# Patient Record
Sex: FEMALE | Race: WHITE | NOT HISPANIC OR LATINO
[De-identification: names, ages, dates, MRNs, and addresses within clinical notes are randomized per-mention and may not be internally consistent; named-entity substitution may affect disease eponyms.]

---

## 2017-02-13 ENCOUNTER — APPOINTMENT (OUTPATIENT)
Dept: INTERNAL MEDICINE | Facility: CLINIC | Age: 82
End: 2017-02-13

## 2017-02-13 VITALS
TEMPERATURE: 97.8 F | WEIGHT: 122 LBS | SYSTOLIC BLOOD PRESSURE: 115 MMHG | HEART RATE: 73 BPM | BODY MASS INDEX: 23.33 KG/M2 | DIASTOLIC BLOOD PRESSURE: 70 MMHG | HEIGHT: 60.5 IN

## 2017-02-14 LAB
25(OH)D3 SERPL-MCNC: 30 NG/ML
ALBUMIN SERPL ELPH-MCNC: 4.2 G/DL
ALP BLD-CCNC: 51 U/L
ALT SERPL-CCNC: 10 U/L
ANION GAP SERPL CALC-SCNC: 13 MMOL/L
APPEARANCE: CLEAR
AST SERPL-CCNC: 23 U/L
BASOPHILS # BLD AUTO: 0.02 K/UL
BASOPHILS NFR BLD AUTO: 0.3 %
BILIRUB SERPL-MCNC: 0.2 MG/DL
BILIRUBIN URINE: NEGATIVE
BLOOD URINE: NEGATIVE
BUN SERPL-MCNC: 20 MG/DL
CALCIUM SERPL-MCNC: 9.5 MG/DL
CHLORIDE SERPL-SCNC: 106 MMOL/L
CHOLEST SERPL-MCNC: 184 MG/DL
CHOLEST/HDLC SERPL: 2.4 RATIO
CO2 SERPL-SCNC: 23 MMOL/L
COLOR: YELLOW
CREAT SERPL-MCNC: 0.78 MG/DL
EOSINOPHIL # BLD AUTO: 0.25 K/UL
EOSINOPHIL NFR BLD AUTO: 3.7 %
GLUCOSE QUALITATIVE U: NORMAL MG/DL
GLUCOSE SERPL-MCNC: 85 MG/DL
HBA1C MFR BLD HPLC: 5.8 %
HCT VFR BLD CALC: 43.9 %
HDLC SERPL-MCNC: 76 MG/DL
HGB BLD-MCNC: 14.2 G/DL
IMM GRANULOCYTES NFR BLD AUTO: 0.1 %
KETONES URINE: NEGATIVE
LDLC SERPL CALC-MCNC: 95 MG/DL
LEUKOCYTE ESTERASE URINE: NEGATIVE
LYMPHOCYTES # BLD AUTO: 2.74 K/UL
LYMPHOCYTES NFR BLD AUTO: 40.5 %
MAN DIFF?: NORMAL
MCHC RBC-ENTMCNC: 32.3 GM/DL
MCHC RBC-ENTMCNC: 32.3 PG
MCV RBC AUTO: 100 FL
MONOCYTES # BLD AUTO: 0.61 K/UL
MONOCYTES NFR BLD AUTO: 9 %
NEUTROPHILS # BLD AUTO: 3.14 K/UL
NEUTROPHILS NFR BLD AUTO: 46.4 %
NITRITE URINE: NEGATIVE
PH URINE: 6
PLATELET # BLD AUTO: 253 K/UL
POTASSIUM SERPL-SCNC: 4.6 MMOL/L
PROT SERPL-MCNC: 7.1 G/DL
PROTEIN URINE: NEGATIVE MG/DL
RBC # BLD: 4.39 M/UL
RBC # FLD: 13.4 %
SODIUM SERPL-SCNC: 142 MMOL/L
SPECIFIC GRAVITY URINE: 1.01
TRIGL SERPL-MCNC: 65 MG/DL
TSH SERPL-ACNC: 2.64 UIU/ML
UROBILINOGEN URINE: NORMAL MG/DL
VIT B12 SERPL-MCNC: 562 PG/ML
WBC # FLD AUTO: 6.77 K/UL

## 2017-02-15 LAB — BACTERIA UR CULT: NORMAL

## 2017-02-24 ENCOUNTER — MEDICATION RENEWAL (OUTPATIENT)
Age: 82
End: 2017-02-24

## 2017-04-03 ENCOUNTER — APPOINTMENT (OUTPATIENT)
Dept: INTERNAL MEDICINE | Facility: CLINIC | Age: 82
End: 2017-04-03

## 2017-04-03 VITALS
BODY MASS INDEX: 23.14 KG/M2 | WEIGHT: 121 LBS | HEART RATE: 90 BPM | HEIGHT: 60.5 IN | TEMPERATURE: 98.2 F | SYSTOLIC BLOOD PRESSURE: 117 MMHG | DIASTOLIC BLOOD PRESSURE: 70 MMHG

## 2017-04-09 ENCOUNTER — FORM ENCOUNTER (OUTPATIENT)
Age: 82
End: 2017-04-09

## 2017-04-10 ENCOUNTER — APPOINTMENT (OUTPATIENT)
Dept: RADIOLOGY | Facility: CLINIC | Age: 82
End: 2017-04-10

## 2017-04-10 ENCOUNTER — OUTPATIENT (OUTPATIENT)
Dept: OUTPATIENT SERVICES | Facility: HOSPITAL | Age: 82
LOS: 1 days | End: 2017-04-10
Payer: COMMERCIAL

## 2017-04-10 ENCOUNTER — APPOINTMENT (OUTPATIENT)
Dept: ULTRASOUND IMAGING | Facility: CLINIC | Age: 82
End: 2017-04-10

## 2017-04-10 DIAGNOSIS — R07.89 OTHER CHEST PAIN: ICD-10-CM

## 2017-04-10 DIAGNOSIS — Z00.8 ENCOUNTER FOR OTHER GENERAL EXAMINATION: ICD-10-CM

## 2017-04-10 PROCEDURE — 76700 US EXAM ABDOM COMPLETE: CPT

## 2017-04-10 PROCEDURE — 71046 X-RAY EXAM CHEST 2 VIEWS: CPT

## 2017-04-10 PROCEDURE — 71100 X-RAY EXAM RIBS UNI 2 VIEWS: CPT

## 2017-10-20 ENCOUNTER — APPOINTMENT (OUTPATIENT)
Dept: INTERNAL MEDICINE | Facility: CLINIC | Age: 82
End: 2017-10-20
Payer: MEDICARE

## 2017-10-20 VITALS
OXYGEN SATURATION: 98 % | WEIGHT: 118 LBS | HEIGHT: 60.5 IN | DIASTOLIC BLOOD PRESSURE: 76 MMHG | TEMPERATURE: 97.4 F | BODY MASS INDEX: 22.57 KG/M2 | SYSTOLIC BLOOD PRESSURE: 104 MMHG | HEART RATE: 76 BPM

## 2017-10-20 PROCEDURE — 99214 OFFICE O/P EST MOD 30 MIN: CPT

## 2017-10-23 LAB
CHOLEST SERPL-MCNC: 247 MG/DL
CHOLEST/HDLC SERPL: 3 RATIO
HDLC SERPL-MCNC: 81 MG/DL
LDLC SERPL CALC-MCNC: 147 MG/DL
TRIGL SERPL-MCNC: 96 MG/DL

## 2017-11-28 ENCOUNTER — RX RENEWAL (OUTPATIENT)
Age: 82
End: 2017-11-28

## 2018-01-02 ENCOUNTER — APPOINTMENT (OUTPATIENT)
Dept: INTERNAL MEDICINE | Facility: CLINIC | Age: 83
End: 2018-01-02
Payer: MEDICARE

## 2018-01-02 VITALS
WEIGHT: 120 LBS | DIASTOLIC BLOOD PRESSURE: 72 MMHG | OXYGEN SATURATION: 98 % | SYSTOLIC BLOOD PRESSURE: 110 MMHG | TEMPERATURE: 98.3 F | BODY MASS INDEX: 22.95 KG/M2 | HEART RATE: 54 BPM | HEIGHT: 60.5 IN

## 2018-01-02 PROCEDURE — 99214 OFFICE O/P EST MOD 30 MIN: CPT

## 2018-02-15 ENCOUNTER — APPOINTMENT (OUTPATIENT)
Dept: INTERNAL MEDICINE | Facility: CLINIC | Age: 83
End: 2018-02-15
Payer: MEDICARE

## 2018-02-15 ENCOUNTER — NON-APPOINTMENT (OUTPATIENT)
Age: 83
End: 2018-02-15

## 2018-02-15 VITALS
DIASTOLIC BLOOD PRESSURE: 70 MMHG | WEIGHT: 120 LBS | TEMPERATURE: 98.7 F | OXYGEN SATURATION: 97 % | SYSTOLIC BLOOD PRESSURE: 100 MMHG | BODY MASS INDEX: 22.95 KG/M2 | HEART RATE: 88 BPM | HEIGHT: 60.5 IN

## 2018-02-15 DIAGNOSIS — H91.92 UNSPECIFIED HEARING LOSS, LEFT EAR: ICD-10-CM

## 2018-02-15 PROCEDURE — G0444 DEPRESSION SCREEN ANNUAL: CPT

## 2018-02-15 PROCEDURE — 93000 ELECTROCARDIOGRAM COMPLETE: CPT

## 2018-02-15 PROCEDURE — 36415 COLL VENOUS BLD VENIPUNCTURE: CPT

## 2018-02-15 PROCEDURE — G0439: CPT | Mod: 25

## 2018-02-16 LAB
25(OH)D3 SERPL-MCNC: 30.1 NG/ML
ALBUMIN SERPL ELPH-MCNC: 4.2 G/DL
ALP BLD-CCNC: 51 U/L
ALT SERPL-CCNC: 15 U/L
ANION GAP SERPL CALC-SCNC: 15 MMOL/L
APPEARANCE: CLEAR
AST SERPL-CCNC: 24 U/L
BASOPHILS # BLD AUTO: 0.02 K/UL
BASOPHILS NFR BLD AUTO: 0.3 %
BILIRUB SERPL-MCNC: 0.3 MG/DL
BILIRUBIN URINE: NEGATIVE
BLOOD URINE: NEGATIVE
BUN SERPL-MCNC: 15 MG/DL
CALCIUM SERPL-MCNC: 9.8 MG/DL
CHLORIDE SERPL-SCNC: 102 MMOL/L
CHOLEST SERPL-MCNC: 190 MG/DL
CHOLEST/HDLC SERPL: 2.3 RATIO
CO2 SERPL-SCNC: 25 MMOL/L
COLOR: YELLOW
CREAT SERPL-MCNC: 0.77 MG/DL
EOSINOPHIL # BLD AUTO: 0.19 K/UL
EOSINOPHIL NFR BLD AUTO: 2.7 %
GLUCOSE QUALITATIVE U: NEGATIVE MG/DL
GLUCOSE SERPL-MCNC: 92 MG/DL
HCT VFR BLD CALC: 41.7 %
HDLC SERPL-MCNC: 84 MG/DL
HGB BLD-MCNC: 13.7 G/DL
IMM GRANULOCYTES NFR BLD AUTO: 0.1 %
KETONES URINE: NEGATIVE
LDLC SERPL CALC-MCNC: 89 MG/DL
LEUKOCYTE ESTERASE URINE: NEGATIVE
LYMPHOCYTES # BLD AUTO: 2.61 K/UL
LYMPHOCYTES NFR BLD AUTO: 36.9 %
MAN DIFF?: NORMAL
MCHC RBC-ENTMCNC: 32.9 GM/DL
MCHC RBC-ENTMCNC: 33.3 PG
MCV RBC AUTO: 101.5 FL
MONOCYTES # BLD AUTO: 0.61 K/UL
MONOCYTES NFR BLD AUTO: 8.6 %
NEUTROPHILS # BLD AUTO: 3.64 K/UL
NEUTROPHILS NFR BLD AUTO: 51.4 %
NITRITE URINE: NEGATIVE
PH URINE: 7
PLATELET # BLD AUTO: 245 K/UL
POTASSIUM SERPL-SCNC: 4.7 MMOL/L
PROT SERPL-MCNC: 7.4 G/DL
PROTEIN URINE: NEGATIVE MG/DL
RBC # BLD: 4.11 M/UL
RBC # FLD: 14.1 %
SODIUM SERPL-SCNC: 142 MMOL/L
SPECIFIC GRAVITY URINE: 1.02
T4 FREE SERPL-MCNC: 1.2 NG/DL
TRIGL SERPL-MCNC: 84 MG/DL
TSH SERPL-ACNC: 2.05 UIU/ML
UROBILINOGEN URINE: NEGATIVE MG/DL
VIT B12 SERPL-MCNC: 1022 PG/ML
WBC # FLD AUTO: 7.08 K/UL

## 2018-02-19 LAB — BACTERIA UR CULT: NORMAL

## 2018-02-22 ENCOUNTER — MEDICATION RENEWAL (OUTPATIENT)
Age: 83
End: 2018-02-22

## 2018-03-01 ENCOUNTER — MEDICATION RENEWAL (OUTPATIENT)
Age: 83
End: 2018-03-01

## 2018-03-22 ENCOUNTER — APPOINTMENT (OUTPATIENT)
Dept: INTERNAL MEDICINE | Facility: CLINIC | Age: 83
End: 2018-03-22
Payer: MEDICARE

## 2018-03-22 VITALS
WEIGHT: 122.03 LBS | OXYGEN SATURATION: 98 % | BODY MASS INDEX: 23.34 KG/M2 | SYSTOLIC BLOOD PRESSURE: 90 MMHG | HEIGHT: 60.5 IN | DIASTOLIC BLOOD PRESSURE: 60 MMHG | HEART RATE: 69 BPM | TEMPERATURE: 98 F

## 2018-03-22 PROCEDURE — 99214 OFFICE O/P EST MOD 30 MIN: CPT

## 2018-06-22 ENCOUNTER — APPOINTMENT (OUTPATIENT)
Dept: INTERNAL MEDICINE | Facility: CLINIC | Age: 83
End: 2018-06-22
Payer: MEDICARE

## 2018-06-22 VITALS
WEIGHT: 120 LBS | SYSTOLIC BLOOD PRESSURE: 125 MMHG | HEART RATE: 75 BPM | TEMPERATURE: 97.9 F | HEIGHT: 60.5 IN | OXYGEN SATURATION: 98 % | DIASTOLIC BLOOD PRESSURE: 80 MMHG | BODY MASS INDEX: 22.95 KG/M2

## 2018-06-22 PROCEDURE — 99214 OFFICE O/P EST MOD 30 MIN: CPT

## 2018-06-22 RX ORDER — DOXYCYCLINE 100 MG/1
100 CAPSULE ORAL
Qty: 14 | Refills: 0 | Status: DISCONTINUED | COMMUNITY
Start: 2017-12-28

## 2018-06-22 RX ORDER — CIPROFLOXACIN HYDROCHLORIDE 500 MG/1
500 TABLET, FILM COATED ORAL
Qty: 6 | Refills: 0 | Status: DISCONTINUED | COMMUNITY
Start: 2017-12-27

## 2018-06-22 RX ORDER — MAGNESIUM OXIDE 500 MG
5 TABLET ORAL
Qty: 90 | Refills: 3 | Status: DISCONTINUED | COMMUNITY
Start: 2018-03-22 | End: 2018-06-22

## 2018-06-22 NOTE — ASSESSMENT
[FreeTextEntry1] : Memory loss/ depression - phq 9 --9 improved from 16 - Alzheimers dementia \par -on escitalopram 20 mg - continue same dose tolerating w/u side effetcs \par - stopped melatonin due to diarrhea \par - advised to monitor if worsening to consider Namenda , \par -Advise patient to join community Center to keep herself busy, Involve in activities\par -f/u 3-6 months \par \par urinary incontinence \par -stable\par \par Severe hand deformities/Arthritis\par -Most probably OA , Arthritis panel negative \par \par freckles , moles- wart left fore arm \par -dermatology consult reviewed \par \par Health maintenance\par Pneumovax 2009 \par  Flu vaccine-at pharmacy \par Deferred screening discussed with family son.

## 2018-06-22 NOTE — HISTORY OF PRESENT ILLNESS
[Other: _____] : [unfilled] [de-identified] : \par This is a 89 -year-old female with past medical history of glaucoma, hand arthritis, hyperlipidemia , recent memory loss, who is accompanied by her son for f/u on ch medical condition from last visit \par \par memory loss-on escitalopram 20 po daily for last 4 months - doing better - more engaged , no side effects noted.   Recently noticed memory loss was seen by gerontologist- and was prescribed Namenda, but they did not start as her daughter is neuropsychiatric who thought it was too strong for her- she has only mild memory impairment, physically active, does cooking, and all basic activities of daily living with help from her daughters and son, her daughter manages her billing / shopping\par -Denies any history of falls or getting lost\par -Has problem with recollecting dates, and learning new things \par -she use to go to [facility], with  , but stopped after his death , looking to find one which will suit her needs , doing crossword puzzles \par \par Glaucoma-Sees ophthalmologist every 6 months uses eyedrops\par \par History of hand arthritis Since she was in her 30s- does not take any medication, significant deformities of her hands, arthritis panel negative \par \par h/o urinary incontinence  - had UTI episode 1/2018 , around that episode and since then she has been feeling low depressed - and talks about being in a box and not waking up , sleep ok , appetite ok , low mood \par - wearing pads for accidents for 1 yr \par \par  hearing impairment More, left ear- saw hearing center did testing \par -Lives with daughter and son \par \par stopped going to  [employer] - don’t know why ? daughter got busy

## 2018-11-18 ENCOUNTER — RX RENEWAL (OUTPATIENT)
Age: 83
End: 2018-11-18

## 2019-02-21 ENCOUNTER — APPOINTMENT (OUTPATIENT)
Dept: INTERNAL MEDICINE | Facility: CLINIC | Age: 84
End: 2019-02-21
Payer: MEDICARE

## 2019-02-21 VITALS
HEIGHT: 60.5 IN | TEMPERATURE: 98.7 F | BODY MASS INDEX: 22.95 KG/M2 | SYSTOLIC BLOOD PRESSURE: 90 MMHG | WEIGHT: 120 LBS | HEART RATE: 66 BPM | DIASTOLIC BLOOD PRESSURE: 68 MMHG | RESPIRATION RATE: 14 BRPM

## 2019-02-21 PROCEDURE — G0439: CPT | Mod: 25

## 2019-02-21 PROCEDURE — 36415 COLL VENOUS BLD VENIPUNCTURE: CPT

## 2019-02-21 PROCEDURE — G0444 DEPRESSION SCREEN ANNUAL: CPT

## 2019-02-21 NOTE — ASSESSMENT
[FreeTextEntry1] : Memory loss/ depression - phq 9 --9 improved from 16 - Alzheimers dementia \par -on escitalopram 20 mg - continue same dose tolerating w/u side effetcs \par - stopped melatonin due to diarrhea \par - advised to monitor if worsening to consider Namenda , \par -Advise patient to join community Center to keep herself busy, Involve in activities\par -f/u 3-6 months \par \par Alz dementia- \par - start Namenda  5 mg daily\par f/u 3 months \par \par urinary incontinence \par -get Ua and cultures \par \par Severe hand deformities/Arthritis\par -Most probably OA , Arthritis panel negative \par \par freckles , moles- wart left fore arm \par -dermatology consult reviewed \par \par Health maintenance\par Pneumovax 2009 \par  Flu vaccine-at pharmacy 10/2018 as per son \par Deferred screening discussed with family son.

## 2019-02-21 NOTE — HISTORY OF PRESENT ILLNESS
[Family Member] : family member [Other: _____] : [unfilled] [de-identified] : \par This is a 89 -year-old female with past medical history of glaucoma, hand arthritis, hyperlipidemia , recent memory loss, who is accompanied by her son for annual check up \par \par memory loss-on escitalopram 20 po daily for last 4 months - doing better - more engaged , no side effects noted.   Recently noticed memory loss was seen by gerontologist- and was prescribed Namenda, but they did not start as her daughter is neuropsychiatric who thought it was too strong for her- she has only mild memory impairment, physically active, does cooking, and all basic activities of daily living with help from her daughters and son, her daughter manages her billing / shopping\par -Denies any history of falls or getting lost\par -Has problem with recollecting dates, and learning new things \par -she use to go to [facility], with  , but stopped after his death , looking to find one which will suit her needs , doing crossword puzzles \par \par Glaucoma-Sees ophthalmologist every 6 months uses eyedrops\par \par History of hand arthritis Since she was in her 30s- does not take any medication, significant deformities of her hands, arthritis panel negative \par \par h/o urinary incontinence  - had UTI episode 1/2018 , around that episode and since then she has been feeling low depressed - and talks about being in a box and not waking up , sleep ok , appetite ok , low mood \par - wearing pads for accidents for 1 yr \par \par  hearing impairment More, left ear- saw hearing center did testing \par -Lives with daughter and son \par \par stopped going to  [employer] - don’t know why ? daughter got busy

## 2019-02-22 LAB
25(OH)D3 SERPL-MCNC: 29.1 NG/ML
ALBUMIN SERPL ELPH-MCNC: 4.3 G/DL
ALP BLD-CCNC: 52 U/L
ALT SERPL-CCNC: 14 U/L
ANION GAP SERPL CALC-SCNC: 12 MMOL/L
AST SERPL-CCNC: 24 U/L
BASOPHILS # BLD AUTO: 0.03 K/UL
BASOPHILS NFR BLD AUTO: 0.5 %
BILIRUB SERPL-MCNC: 0.4 MG/DL
BUN SERPL-MCNC: 14 MG/DL
CALCIUM SERPL-MCNC: 9.5 MG/DL
CHLORIDE SERPL-SCNC: 105 MMOL/L
CHOLEST SERPL-MCNC: 186 MG/DL
CHOLEST/HDLC SERPL: 2.5 RATIO
CO2 SERPL-SCNC: 24 MMOL/L
CREAT SERPL-MCNC: 0.69 MG/DL
EOSINOPHIL # BLD AUTO: 0.18 K/UL
EOSINOPHIL NFR BLD AUTO: 2.8 %
GLUCOSE SERPL-MCNC: 90 MG/DL
HCT VFR BLD CALC: 44 %
HDLC SERPL-MCNC: 74 MG/DL
HGB BLD-MCNC: 14.3 G/DL
IMM GRANULOCYTES NFR BLD AUTO: 0.2 %
LDLC SERPL CALC-MCNC: 94 MG/DL
LYMPHOCYTES # BLD AUTO: 2.17 K/UL
LYMPHOCYTES NFR BLD AUTO: 34.3 %
MAN DIFF?: NORMAL
MCHC RBC-ENTMCNC: 32.5 GM/DL
MCHC RBC-ENTMCNC: 33.3 PG
MCV RBC AUTO: 102.3 FL
MONOCYTES # BLD AUTO: 0.46 K/UL
MONOCYTES NFR BLD AUTO: 7.3 %
NEUTROPHILS # BLD AUTO: 3.47 K/UL
NEUTROPHILS NFR BLD AUTO: 54.9 %
PLATELET # BLD AUTO: 238 K/UL
POTASSIUM SERPL-SCNC: 4.9 MMOL/L
PROT SERPL-MCNC: 7.2 G/DL
RBC # BLD: 4.3 M/UL
RBC # FLD: 13.3 %
SODIUM SERPL-SCNC: 141 MMOL/L
TRIGL SERPL-MCNC: 89 MG/DL
TSH SERPL-ACNC: 2.61 UIU/ML
VIT B12 SERPL-MCNC: 938 PG/ML
WBC # FLD AUTO: 6.32 K/UL

## 2019-05-16 ENCOUNTER — MEDICATION RENEWAL (OUTPATIENT)
Age: 84
End: 2019-05-16

## 2019-05-22 ENCOUNTER — APPOINTMENT (OUTPATIENT)
Dept: INTERNAL MEDICINE | Facility: CLINIC | Age: 84
End: 2019-05-22
Payer: MEDICARE

## 2019-05-22 VITALS
SYSTOLIC BLOOD PRESSURE: 110 MMHG | WEIGHT: 117 LBS | HEART RATE: 80 BPM | HEIGHT: 60.5 IN | DIASTOLIC BLOOD PRESSURE: 70 MMHG | BODY MASS INDEX: 22.38 KG/M2 | OXYGEN SATURATION: 98 %

## 2019-05-22 LAB
APPEARANCE: CLEAR
BILIRUBIN URINE: NEGATIVE
BLOOD URINE: NEGATIVE
COLOR: YELLOW
GLUCOSE QUALITATIVE U: NEGATIVE
KETONES URINE: NEGATIVE
LEUKOCYTE ESTERASE URINE: NEGATIVE
NITRITE URINE: NEGATIVE
PH URINE: 6.5
PROTEIN URINE: NORMAL
SPECIFIC GRAVITY URINE: 1.02
UROBILINOGEN URINE: NORMAL

## 2019-05-22 PROCEDURE — 99214 OFFICE O/P EST MOD 30 MIN: CPT

## 2019-05-22 NOTE — ASSESSMENT
[FreeTextEntry1] : Memory loss/ depression - phq 9 --9 improved from 16 - Alzheimers dementia \par -on escitalopram 20 mg - continue same dose tolerating w/u side effetcs \par - stopped melatonin due to diarrhea \par - advised to monitor if worsening to consider Namenda , \par -Advise patient to join community Center to keep herself busy, Involve in activities\par -f/u 3-6 months \par \par Alz dementia- \par - continue Namenda 5 mg daily\par f/u 3 months \par \par urinary incontinence \par -get Ua and cultures \par \par Severe hand deformities/Arthritis\par -Most probably OA , Arthritis panel negative \par \par freckles , moles- wart left fore arm \par -dermatology consult reviewed \par \par Health maintenance\par Pneumovax 2009 \par  Flu vaccine-at pharmacy 10/2018 as per son \par Deferred screening discussed with family son.

## 2019-05-22 NOTE — HISTORY OF PRESENT ILLNESS
[Other: _____] : [unfilled] [de-identified] : \par This is a 89 -year-old female with past medical history of glaucoma, hand arthritis, hyperlipidemia , recent memory loss, who is accompanied by her son for  f/u and filling forms for HHA \par \par memory loss-on escitalopram 20 po daily for last 4 months - forgetting names - stays up at night , forges to eat food - sits there needs reminding \par -doing better - more engaged , no side effects noted.   Recently noticed memory loss was seen by gerontologist- and was prescribed Namenda, but they did not start as her daughter is neuropsychiatric who thought it was too strong for her- she has only mild memory impairment, physically active, does cooking, and all basic activities of daily living with help from her daughters and son, her daughter manages her billing / shopping\par -Denies any history of falls or getting lost\par -Has problem with recollecting dates, and learning new things \par -she use to go to [facility], with  , but stopped after his death , looking to find one which will suit her needs , doing crossword puzzles \par \par Glaucoma-Sees ophthalmologist every 6 months uses eyedrops\par \par History of hand arthritis Since she was in her 30s- does not take any medication, significant deformities of her hands, arthritis panel negative \par \par h/o urinary incontinence  - had UTI episode 1/2018 , around that episode and since then she has been feeling low depressed - and talks about being in a box and not waking up , sleep ok , appetite ok , low mood \par - wearing pads for accidents for 1 yr \par \par  hearing impairment More, left ear- saw hearing center did testing \par -Lives with daughter and son \par \par stopped going to  [employer] - don’t know why ? daughter got busy

## 2019-05-24 LAB — BACTERIA UR CULT: NORMAL

## 2019-08-20 ENCOUNTER — RX RENEWAL (OUTPATIENT)
Age: 84
End: 2019-08-20

## 2019-08-20 ENCOUNTER — MEDICATION RENEWAL (OUTPATIENT)
Age: 84
End: 2019-08-20

## 2019-11-04 ENCOUNTER — RX RENEWAL (OUTPATIENT)
Age: 84
End: 2019-11-04

## 2019-11-20 ENCOUNTER — APPOINTMENT (OUTPATIENT)
Dept: INTERNAL MEDICINE | Facility: CLINIC | Age: 84
End: 2019-11-20
Payer: MEDICARE

## 2019-11-20 VITALS
HEART RATE: 77 BPM | HEIGHT: 60.5 IN | BODY MASS INDEX: 23.33 KG/M2 | OXYGEN SATURATION: 98 % | DIASTOLIC BLOOD PRESSURE: 56 MMHG | WEIGHT: 122 LBS | SYSTOLIC BLOOD PRESSURE: 94 MMHG | TEMPERATURE: 98.2 F

## 2019-11-20 DIAGNOSIS — G30.0 ALZHEIMER'S DISEASE WITH EARLY ONSET: ICD-10-CM

## 2019-11-20 DIAGNOSIS — F02.80 ALZHEIMER'S DISEASE WITH EARLY ONSET: ICD-10-CM

## 2019-11-20 PROCEDURE — 99214 OFFICE O/P EST MOD 30 MIN: CPT

## 2019-11-20 NOTE — ASSESSMENT
[FreeTextEntry1] : Memory loss/ depression - phq 9 --9 improved from 16\par -on escitalopram 20 mg - continue same dose tolerating w/u side effetcs \par - stopped melatonin due to diarrhea \par - advised to monitor if worsening to consider Namenda , \par -Advise patient to join community Center to keep herself busy, Involve in activities\par -f/u 3-6 months \par \par Alz dementia- [facility]ms- 14/30 \par - change to Namenda 5 mg twice daily\par f/u 3 months \par \par urinary incontinence \par -get Ua and cultures \par \par Severe hand deformities/Arthritis\par -Most probably OA , Arthritis panel negative \par \par freckles , moles- wart left fore arm \par -dermatology consult reviewed \par \par Health maintenance\par Pneumovax 2009 \par  Flu vaccine-at pharmacy 10/2019 as per son \par Deferred screening discussed with family son.

## 2019-11-20 NOTE — HISTORY OF PRESENT ILLNESS
[Other: _____] : [unfilled] [de-identified] : This is a 89 -year-old female with past medical history of glaucoma, hand arthritis, hyperlipidemia , recent memory loss, who is accompanied by her son for f/u on ch medical issues \par need HHA forms filled \par \par Alzhimers dementia- depression -on escitalopram 20 po daily for last 1 yr - forgetting names - stays up at night , forges to eat food - sits there needs reminding \par -- in past was seen by gerontologist-  [facility], but they did not start as her daughter is neuropsychiatric who thought it was too strong for her- she has only mild memory impairment, physically active, does cooking, and all basic activities of daily living with help from her daughters and son, her daughter manages her billing / shopping\par -Denies any history of falls or getting lost\par -Has problem with recollecting dates, and learning new things \par -she use to go to [facility], with  , but stopped after his death , looking to find one which will suit her needs , doing crossword puzzles \par \par Glaucoma-Sees ophthalmologist every 6 months uses eyedrops\par \par History of hand arthritis Since she was in her 30s- does not take any medication, significant deformities of her hands, arthritis panel negative \par \par h/o urinary incontinence - had UTI episode 1/2018 , around that episode and since then she has been feeling low depressed - and talks about being in a box and not waking up , sleep ok , appetite ok , low mood \par - wearing pads for accidents for 1 yr \par \par  hearing impairment More, left ear- saw hearing center did testing \par -Lives with daughter and son \par \par

## 2020-02-26 ENCOUNTER — APPOINTMENT (OUTPATIENT)
Dept: INTERNAL MEDICINE | Facility: CLINIC | Age: 85
End: 2020-02-26
Payer: MEDICARE

## 2020-02-26 VITALS
SYSTOLIC BLOOD PRESSURE: 100 MMHG | TEMPERATURE: 97.7 F | OXYGEN SATURATION: 99 % | DIASTOLIC BLOOD PRESSURE: 62 MMHG | WEIGHT: 127 LBS | HEART RATE: 84 BPM | HEIGHT: 60.5 IN | BODY MASS INDEX: 24.29 KG/M2

## 2020-02-26 PROCEDURE — 36415 COLL VENOUS BLD VENIPUNCTURE: CPT

## 2020-02-26 PROCEDURE — G0439: CPT | Mod: 25

## 2020-02-26 PROCEDURE — G0444 DEPRESSION SCREEN ANNUAL: CPT

## 2020-02-26 NOTE — PHYSICAL EXAM
[No Acute Distress] : no acute distress [Well Nourished] : well nourished [Well-Appearing] : well-appearing [Well Developed] : well developed [PERRL] : pupils equal round and reactive to light [Normal Sclera/Conjunctiva] : normal sclera/conjunctiva [EOMI] : extraocular movements intact [Normal Outer Ear/Nose] : the outer ears and nose were normal in appearance [Normal Oropharynx] : the oropharynx was normal [No JVD] : no jugular venous distention [No Lymphadenopathy] : no lymphadenopathy [Supple] : supple [Thyroid Normal, No Nodules] : the thyroid was normal and there were no nodules present [No Respiratory Distress] : no respiratory distress  [No Accessory Muscle Use] : no accessory muscle use [Clear to Auscultation] : lungs were clear to auscultation bilaterally [Normal Rate] : normal rate  [Regular Rhythm] : with a regular rhythm [Normal S1, S2] : normal S1 and S2 [No Murmur] : no murmur heard [No Abdominal Bruit] : a ~M bruit was not heard ~T in the abdomen [No Carotid Bruits] : no carotid bruits [No Varicosities] : no varicosities [Pedal Pulses Present] : the pedal pulses are present [No Edema] : there was no peripheral edema [No Palpable Aorta] : no palpable aorta [No Extremity Clubbing/Cyanosis] : no extremity clubbing/cyanosis [Soft] : abdomen soft [Non Tender] : non-tender [Non-distended] : non-distended [No Masses] : no abdominal mass palpated [No HSM] : no HSM [Normal Bowel Sounds] : normal bowel sounds [Normal Posterior Cervical Nodes] : no posterior cervical lymphadenopathy [Normal Anterior Cervical Nodes] : no anterior cervical lymphadenopathy [No CVA Tenderness] : no CVA  tenderness [No Spinal Tenderness] : no spinal tenderness [No Joint Swelling] : no joint swelling [Grossly Normal Strength/Tone] : grossly normal strength/tone [No Rash] : no rash [Coordination Grossly Intact] : coordination grossly intact [Normal Gait] : normal gait [No Focal Deficits] : no focal deficits [Deep Tendon Reflexes (DTR)] : deep tendon reflexes were 2+ and symmetric [Normal Affect] : the affect was normal [Normal Insight/Judgement] : insight and judgment were intact [de-identified] : arthritis hand deformities

## 2020-02-26 NOTE — REVIEW OF SYSTEMS
[Confusion] : confusion [Memory Loss] : memory loss [Anxiety] : anxiety [Negative] : Heme/Lymph [Insomnia] : no insomnia

## 2020-02-26 NOTE — HEALTH RISK ASSESSMENT
[Fair] :  ~his/her~ mood as fair [No] : No [No falls in past year] : Patient reported no falls in the past year [0] : 1) Little interest or pleasure doing things: Not at all (0) [Feels Safe at Home] : Feels safe at home [FreeTextEntry1] : memory loss  [de-identified] : Sedentary  [] : No [XZX0Alcss] : 0 [Reports changes in hearing] : Reports no changes in hearing [Reports changes in dental health] : Reports no changes in dental health [Reports changes in vision] : Reports no changes in vision [FreeTextEntry3] : son [de-identified] : needs help  [de-identified] : family manages it son and daughter

## 2020-02-26 NOTE — HISTORY OF PRESENT ILLNESS
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 \par \par Alzheimers dementia- depression -on escitalopram 20 po daily since 2018  - forgetting names - stays up at night , forgets to eat food - sits there needs reminding \par -- in past was seen by gerontologist-now on  Namenda,  her daughter is neuropsychiatric who thought it was too strong for her , needs help with basic activities of daily living , get help from her daughters and son, her daughter manages her billing / shopping\par -Denies any history of falls or getting lost\par -Has problem with recollecting dates, and learning new things \par -[facility]  , but stopped after his death , looking to find one which will suit her needs , doing crossword puzzles \par \par Glaucoma-Sees ophthalmologist every 6 months uses eyedrops\par \par History of hand arthritis Since she was in her 30s- does not take any medication, significant deformities of her hands, arthritis panel negative \par \par h/o urinary incontinence - had UTI episode 1/2018 , around that episode and since then she has been feeling low depressed - and talks about being in a box and not waking up , sleep ok , appetite ok , low mood \par - wearing pads for accidents for 1 yr \par \par  hearing impairment More, left ear- saw hearing center did testing \par -Lives with daughter and son \par

## 2020-02-26 NOTE — ASSESSMENT
[FreeTextEntry1] : new onset delusion episodic with hallucination \par - get CBC , UA C?S and MRI brain r/o infarct vs mass \par \par Memory loss/ depression - phq 9 --9 improved from 16\par -on escitalopram 20 mg - continue same dose tolerating w/u side effetcs \par - stopped melatonin due to diarrhea \par - advised to monitor if worsening to consider Namenda , \par -Advise patient to join community Center to keep herself busy, Involve in activities\par -f/u 3-6 months \par \par Alz dementia- [facility]- 12/30 \par -on Namenda 5 mg twice daily\par f/u 3 months \par \par urinary incontinence \par -get Ua and cultures \par \par Severe hand deformities/Arthritis\par -Most probably OA , Arthritis panel negative \par \par freckles , moles- wart left fore arm \par -dermatology consult reviewed \par \par Health maintenance\par Pneumovax 2009 \par  Flu vaccine-at pharmacy 10/2019 as per son \par Deferred screening discussed with family son.

## 2020-02-27 LAB
ALBUMIN SERPL ELPH-MCNC: 4.2 G/DL
ALP BLD-CCNC: 49 U/L
ALT SERPL-CCNC: 12 U/L
ANION GAP SERPL CALC-SCNC: 12 MMOL/L
APPEARANCE: CLEAR
AST SERPL-CCNC: 20 U/L
BASOPHILS # BLD AUTO: 0.05 K/UL
BASOPHILS NFR BLD AUTO: 0.7 %
BILIRUB SERPL-MCNC: 0.4 MG/DL
BILIRUBIN URINE: NEGATIVE
BLOOD URINE: NEGATIVE
BUN SERPL-MCNC: 15 MG/DL
CALCIUM SERPL-MCNC: 9.2 MG/DL
CHLORIDE SERPL-SCNC: 106 MMOL/L
CHOLEST SERPL-MCNC: 188 MG/DL
CHOLEST/HDLC SERPL: 2.4 RATIO
CO2 SERPL-SCNC: 25 MMOL/L
COLOR: NORMAL
CREAT SERPL-MCNC: 0.69 MG/DL
EOSINOPHIL # BLD AUTO: 0.35 K/UL
EOSINOPHIL NFR BLD AUTO: 5 %
ESTIMATED AVERAGE GLUCOSE: 117 MG/DL
GLUCOSE QUALITATIVE U: NEGATIVE
GLUCOSE SERPL-MCNC: 74 MG/DL
HBA1C MFR BLD HPLC: 5.7 %
HCT VFR BLD CALC: 44.8 %
HDLC SERPL-MCNC: 78 MG/DL
HGB BLD-MCNC: 14.3 G/DL
IMM GRANULOCYTES NFR BLD AUTO: 0.1 %
KETONES URINE: NEGATIVE
LDLC SERPL CALC-MCNC: 81 MG/DL
LEUKOCYTE ESTERASE URINE: NEGATIVE
LYMPHOCYTES # BLD AUTO: 2.7 K/UL
LYMPHOCYTES NFR BLD AUTO: 38.6 %
MAN DIFF?: NORMAL
MCHC RBC-ENTMCNC: 31.9 GM/DL
MCHC RBC-ENTMCNC: 33 PG
MCV RBC AUTO: 103.5 FL
MONOCYTES # BLD AUTO: 0.69 K/UL
MONOCYTES NFR BLD AUTO: 9.9 %
NEUTROPHILS # BLD AUTO: 3.19 K/UL
NEUTROPHILS NFR BLD AUTO: 45.7 %
NITRITE URINE: NEGATIVE
PH URINE: 6
PLATELET # BLD AUTO: 249 K/UL
POTASSIUM SERPL-SCNC: 4.9 MMOL/L
PROT SERPL-MCNC: 6.6 G/DL
PROTEIN URINE: NEGATIVE
RBC # BLD: 4.33 M/UL
RBC # FLD: 13.4 %
SODIUM SERPL-SCNC: 142 MMOL/L
SPECIFIC GRAVITY URINE: 1.01
TRIGL SERPL-MCNC: 146 MG/DL
TSH SERPL-ACNC: 1.94 UIU/ML
UROBILINOGEN URINE: NORMAL
WBC # FLD AUTO: 6.99 K/UL

## 2020-02-28 LAB — BACTERIA UR CULT: NORMAL

## 2020-03-11 ENCOUNTER — FORM ENCOUNTER (OUTPATIENT)
Age: 85
End: 2020-03-11

## 2020-03-12 ENCOUNTER — OUTPATIENT (OUTPATIENT)
Dept: OUTPATIENT SERVICES | Facility: HOSPITAL | Age: 85
LOS: 1 days | End: 2020-03-12
Payer: COMMERCIAL

## 2020-03-12 ENCOUNTER — APPOINTMENT (OUTPATIENT)
Dept: MRI IMAGING | Facility: CLINIC | Age: 85
End: 2020-03-12
Payer: MEDICARE

## 2020-03-12 DIAGNOSIS — G30.9 ALZHEIMER'S DISEASE, UNSPECIFIED: ICD-10-CM

## 2020-03-12 PROCEDURE — 70551 MRI BRAIN STEM W/O DYE: CPT

## 2020-03-12 PROCEDURE — 70551 MRI BRAIN STEM W/O DYE: CPT | Mod: 26

## 2020-03-13 ENCOUNTER — NON-APPOINTMENT (OUTPATIENT)
Age: 85
End: 2020-03-13

## 2020-05-11 ENCOUNTER — RX RENEWAL (OUTPATIENT)
Age: 85
End: 2020-05-11

## 2020-06-17 ENCOUNTER — APPOINTMENT (OUTPATIENT)
Dept: INTERNAL MEDICINE | Facility: CLINIC | Age: 85
End: 2020-06-17
Payer: MEDICARE

## 2020-06-17 PROCEDURE — 99213 OFFICE O/P EST LOW 20 MIN: CPT | Mod: 95

## 2020-06-17 NOTE — REVIEW OF SYSTEMS
[Memory Loss] : memory loss [Confusion] : confusion [Unsteady Walking] : ataxia [Negative] : Gastrointestinal

## 2020-06-17 NOTE — HISTORY OF PRESENT ILLNESS
[Home] : at home, [unfilled] , at the time of the visit. 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 \par \par Alzheimers dementia- depression -on escitalopram 20 po daily since 2018 - forgetting names - stays up at night , forgets to eat food - sits there needs reminding \par -- in past was seen by gerontologist-now on Namenda, her daughter is neuropsychiatric who thought it was too strong for her , needs help with basic activities of daily living , get help from her daughters and son, her daughter manages her billing / shopping\par -Denies any history of falls or getting lost\par -Has problem with recollecting dates, and learning new things \par -[facility]  , but stopped after his death , looking to find one which will suit her needs , doing crossword puzzles \par \par Glaucoma-Sees ophthalmologist every 6 months uses eyedrops\par \par History of hand arthritis Since she was in her 30s- does not take any medication, significant deformities of her hands, arthritis panel negative \par \par h/o urinary incontinence - had UTI episode 1/2018 , around that episode and since then she has been feeling low depressed - and talks about being in a box and not waking up , sleep ok , appetite ok , low mood \par - wearing pads for accidents for 1 yr \par \par  hearing impairment More, left ear- saw hearing center did testing \par -Lives with daughter and son

## 2020-06-17 NOTE — ASSESSMENT
[FreeTextEntry1] : Memory loss/ depression - with episode of delusion \par - advised to see Neuro psych for evaluation \par - MRI brain 3/2020 reviewed  \par -on escitalopram 20 mg - continue same dose tolerating w/u side effetcs \par - stopped melatonin due to diarrhea \par - advised to monitor if worsening to consider Namenda , \par -Advise patient to join community Center to keep herself busy, Involve in activities\par -f/u 3-6 months \par \par Alz dementia- [facility]- 12/30 \par -change to  Namenda 5 mg 2 tab am and 1 tab pm twice daily\par f/u 3 months \par \par Severe hand deformities/Arthritis\par -Most probably OA , Arthritis panel negative \par \par freckles , moles- wart left fore arm \par -dermatology consult reviewed

## 2020-09-18 ENCOUNTER — APPOINTMENT (OUTPATIENT)
Dept: INTERNAL MEDICINE | Facility: CLINIC | Age: 85
End: 2020-09-18
Payer: MEDICARE

## 2020-09-18 VITALS
TEMPERATURE: 98.5 F | OXYGEN SATURATION: 97 % | SYSTOLIC BLOOD PRESSURE: 100 MMHG | HEART RATE: 86 BPM | DIASTOLIC BLOOD PRESSURE: 60 MMHG

## 2020-09-18 DIAGNOSIS — Z91.81 HISTORY OF FALLING: ICD-10-CM

## 2020-09-18 PROCEDURE — 99214 OFFICE O/P EST MOD 30 MIN: CPT | Mod: 25

## 2020-09-18 PROCEDURE — 36415 COLL VENOUS BLD VENIPUNCTURE: CPT

## 2020-09-18 PROCEDURE — G0008: CPT

## 2020-09-18 PROCEDURE — 90662 IIV NO PRSV INCREASED AG IM: CPT

## 2020-09-18 NOTE — HISTORY OF PRESENT ILLNESS
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 \par \par Alzheimers dementia- depression - now with delusion and hallucination - worsening was once every other month now 2-3 time a monthh \par -on escitalopram 20 po daily since 2018 - forgetting names - stays up at night , forgets to eat food - sits there needs reminding \par -- in past was seen by gerontologist-now on Namenda, her daughter is neuropsychiatric who thought it was too strong for her , needs help with basic activities of daily living , get help from her daughters and son, her daughter manages her billing / shopping\par -Denies any history of falls or getting lost\par -Has problem with recollecting dates, and learning new things \par -[facility]  , but stopped after his death , looking to find one which will suit her needs , doing crossword puzzles \par \par Glaucoma-Sees ophthalmologist every 6 months uses eyedrops\par \par History of hand arthritis Since she was in her 30s- does not take any medication, significant deformities of her hands, arthritis panel negative \par \par h/o urinary incontinence - had UTI episode 1/2018 , around that episode and since then she has been feeling low depressed - and talks about being in a box and not waking up , sleep ok , appetite ok , low mood \par - wearing pads for accidents for 1 yr \par \par  hearing impairment More, left ear- saw hearing center did testing \par -Lives with daughter and son \par

## 2020-09-18 NOTE — ASSESSMENT
[FreeTextEntry1] : Memory loss/ depression - with episode of delusion and hallucination - increasing frequency \par - advised to see [facility] for evaluation \par - MRI brain 3/2020 reviewed \par -on escitalopram 20 mg - continue same dose tolerating w/u side effetcs \par - stopped melatonin due to diarrhea \par - advised to monitor if worsening to consider Namenda , \par -Advise patient to join community Center to keep herself busy, Involve in activities\par -f/u 3-6 months \par \par Alz dementia- [facility]- 12/30 \par -change to Namenda 5 mg 2 tab am and 1 tab pm twice daily\par f/u 3 months \par \par Severe hand deformities/Arthritis\par -Balance issues , lower ext weakness - pt high risk of fall Dw son deferred in home / out side PT due to pandemic - printed home exercises to do he will supervise \par -Most probably OA , Arthritis panel negative \par \par freckles , moles- wart left fore arm \par -dermatology consult reviewed. \par \par Health maintenance\par Pneumovax 2009 \par  Flu vaccine-9/18/2020\par Deferred screening discussed with family son. \par

## 2020-09-21 LAB
ESTIMATED AVERAGE GLUCOSE: 117 MG/DL
HBA1C MFR BLD HPLC: 5.7 %
SARS-COV-2 IGG SERPL IA-ACNC: 44.1 INDEX
SARS-COV-2 IGG SERPL QL IA: POSITIVE

## 2020-10-27 ENCOUNTER — RX RENEWAL (OUTPATIENT)
Age: 85
End: 2020-10-27

## 2020-12-21 ENCOUNTER — RX RENEWAL (OUTPATIENT)
Age: 85
End: 2020-12-21

## 2021-01-24 ENCOUNTER — RX RENEWAL (OUTPATIENT)
Age: 86
End: 2021-01-24

## 2021-03-01 ENCOUNTER — APPOINTMENT (OUTPATIENT)
Dept: INTERNAL MEDICINE | Facility: CLINIC | Age: 86
End: 2021-03-01
Payer: MEDICARE

## 2021-03-01 VITALS
DIASTOLIC BLOOD PRESSURE: 80 MMHG | BODY MASS INDEX: 24.35 KG/M2 | HEART RATE: 74 BPM | WEIGHT: 124 LBS | TEMPERATURE: 97.8 F | OXYGEN SATURATION: 97 % | HEIGHT: 60 IN | SYSTOLIC BLOOD PRESSURE: 130 MMHG

## 2021-03-01 PROCEDURE — G0444 DEPRESSION SCREEN ANNUAL: CPT

## 2021-03-01 PROCEDURE — G0439: CPT

## 2021-03-01 PROCEDURE — 99072 ADDL SUPL MATRL&STAF TM PHE: CPT

## 2021-03-01 PROCEDURE — G0442 ANNUAL ALCOHOL SCREEN 15 MIN: CPT

## 2021-03-01 NOTE — HEALTH RISK ASSESSMENT
[Good] : ~his/her~  mood as  good [Yes] : Yes [Monthly or less (1 pt)] : Monthly or less (1 point) [1 or 2 (0 pts)] : 1 or 2 (0 points) [No falls in past year] : Patient reported no falls in the past year [(PHQ-2) Unable to screen] : PHQ-2: unable to screen [With Family] : lives with family [Single] : single [Reports changes in hearing] : Reports changes in hearing [With Patient/Caregiver] : With Patient/Caregiver [Designated Healthcare Proxy] : Designated healthcare proxy [Name: ___] : Health Care Proxy's Name: [unfilled]  [Relationship: ___] : Relationship: [unfilled] [] : No [Audit-CScore] : 1 [de-identified] : walking with cane  [UnableToScreenReason] : dementia  [Some assistance needed] : using telephone [Full assistance needed] : managing finances [Reports changes in vision] : Reports no changes in vision [Reports changes in dental health] : Reports no changes in dental health [de-identified] : Son [de-identified] : needs help  [de-identified] : needs help  [AdvancecareDate] : 3/1/21 [FreeTextEntry4] : has Filled HCP at home will mail to us

## 2021-03-01 NOTE — ASSESSMENT
[FreeTextEntry1] : Memory loss/ depression - with episode of delusion and hallucination - increasing frequency - delusion no episode since 9/2020 \par - advised to see [facility] for evaluation \par - MRI brain 3/2020 reviewed \par -on escitalopram 20 mg - continue same dose tolerating w/u side effetcs \par - stopped melatonin due to diarrhea \par - advised to monitor if worsening to consider Namenda , \par -Advise patient to join community Center to keep herself busy, Involve in activities\par -f/u 3-6 months \par \par Alz dementia- [facility]- 12/30 -stable\par -Namenda 5 mg 2 tab am and 1 tab pm twice daily( since 6/2020)\par f/u 3 months \par \par Severe hand deformities/Arthritis\par -Balance issues , lower ext weakness - pt high risk of fall Dw son deferred in home / out side PT due to pandemic - printed home exercises to do he will supervise \par -Most probably OA , Arthritis panel negative \par \par freckles , moles- wart left fore arm \par -dermatology consult reviewed. \par \par Health maintenance\par Pneumovax 2009 \par  Flu vaccine-9/18/2020\par Deferred screening discussed with family son. \par  got both doses for Pfizer vaccine last was 2/25/21 \par

## 2021-03-01 NOTE — PHYSICAL EXAM
[No Acute Distress] : no acute distress [Well Nourished] : well nourished [Well Developed] : well developed [Well-Appearing] : well-appearing [Normal Sclera/Conjunctiva] : normal sclera/conjunctiva [PERRL] : pupils equal round and reactive to light [EOMI] : extraocular movements intact [Normal Outer Ear/Nose] : the outer ears and nose were normal in appearance [Normal Oropharynx] : the oropharynx was normal [No JVD] : no jugular venous distention [No Lymphadenopathy] : no lymphadenopathy [Supple] : supple [Thyroid Normal, No Nodules] : the thyroid was normal and there were no nodules present [No Respiratory Distress] : no respiratory distress  [No Accessory Muscle Use] : no accessory muscle use [Clear to Auscultation] : lungs were clear to auscultation bilaterally [Normal Rate] : normal rate  [Regular Rhythm] : with a regular rhythm [Normal S1, S2] : normal S1 and S2 [No Murmur] : no murmur heard [No Carotid Bruits] : no carotid bruits [No Abdominal Bruit] : a ~M bruit was not heard ~T in the abdomen [No Varicosities] : no varicosities [Pedal Pulses Present] : the pedal pulses are present [No Edema] : there was no peripheral edema [No Palpable Aorta] : no palpable aorta [No Extremity Clubbing/Cyanosis] : no extremity clubbing/cyanosis [Soft] : abdomen soft [Non Tender] : non-tender [Non-distended] : non-distended [No Masses] : no abdominal mass palpated [No HSM] : no HSM [Normal Bowel Sounds] : normal bowel sounds [Normal Posterior Cervical Nodes] : no posterior cervical lymphadenopathy [Normal Anterior Cervical Nodes] : no anterior cervical lymphadenopathy [No CVA Tenderness] : no CVA  tenderness [No Spinal Tenderness] : no spinal tenderness [No Rash] : no rash [Coordination Grossly Intact] : coordination grossly intact [No Focal Deficits] : no focal deficits [Normal Gait] : normal gait [Deep Tendon Reflexes (DTR)] : deep tendon reflexes were 2+ and symmetric [Normal Affect] : the affect was normal [Normal Insight/Judgement] : insight and judgment were intact [No Joint Swelling] : no joint swelling [Grossly Normal Strength/Tone] : grossly normal strength/tone [de-identified] : arthritsis hands

## 2021-03-01 NOTE — HISTORY OF PRESENT ILLNESS
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 \par \par Alzheimers dementia- depression - now with delusion and hallucination - worsening was once every other month now 2-3 time a monthh \par -on escitalopram 20 po daily since 2018 - forgetting names - stays up at night , forgets to eat food - sits there needs reminding \par -- in past was seen by gerontologist-now on Namenda, her daughter is neuropsychiatric who thought it was too strong for her , needs help with basic activities of daily living , get help from her daughters and son, her daughter manages her billing / shopping\par -Denies any history of falls or getting lost\par -Has problem with recollecting dates, and learning new things \par -she use to go to [facility], with  , but stopped after his death , looking to find one which will suit her needs , doing crossword puzzles \par \par Glaucoma-Sees ophthalmologist every 6 months uses eyedrops\par \par History of hand arthritis Since she was in her 30s- does not take any medication, significant deformities of her hands, arthritis panel negative \par \par h/o urinary incontinence - had UTI episode 1/2018 , around that episode and since then she has been feeling low depressed - and talks about being in a box and not waking up , sleep ok , appetite ok , low mood \par - wearing pads for accidents for 1 yr \par \par  hearing impairment More, left ear- saw hearing center did testing \par -Lives with daughter and son \par  \par

## 2021-03-02 ENCOUNTER — NON-APPOINTMENT (OUTPATIENT)
Age: 86
End: 2021-03-02

## 2021-03-02 LAB
25(OH)D3 SERPL-MCNC: 25.6 NG/ML
ALBUMIN SERPL ELPH-MCNC: 4 G/DL
ALP BLD-CCNC: 57 U/L
ALT SERPL-CCNC: 9 U/L
ANION GAP SERPL CALC-SCNC: 12 MMOL/L
APPEARANCE: CLEAR
AST SERPL-CCNC: 18 U/L
BASOPHILS # BLD AUTO: 0.04 K/UL
BASOPHILS NFR BLD AUTO: 0.7 %
BILIRUB SERPL-MCNC: 0.3 MG/DL
BILIRUBIN URINE: NEGATIVE
BLOOD URINE: NEGATIVE
BUN SERPL-MCNC: 20 MG/DL
CALCIUM SERPL-MCNC: 9.5 MG/DL
CHLORIDE SERPL-SCNC: 104 MMOL/L
CHOLEST SERPL-MCNC: 187 MG/DL
CO2 SERPL-SCNC: 24 MMOL/L
COLOR: YELLOW
CREAT SERPL-MCNC: 0.77 MG/DL
EOSINOPHIL # BLD AUTO: 0.25 K/UL
EOSINOPHIL NFR BLD AUTO: 4.3 %
ESTIMATED AVERAGE GLUCOSE: 114 MG/DL
GLUCOSE QUALITATIVE U: NEGATIVE
GLUCOSE SERPL-MCNC: 88 MG/DL
HBA1C MFR BLD HPLC: 5.6 %
HCT VFR BLD CALC: 44 %
HDLC SERPL-MCNC: 68 MG/DL
HGB BLD-MCNC: 14 G/DL
IMM GRANULOCYTES NFR BLD AUTO: 0.2 %
KETONES URINE: NEGATIVE
LDLC SERPL CALC-MCNC: 88 MG/DL
LEUKOCYTE ESTERASE URINE: NEGATIVE
LYMPHOCYTES # BLD AUTO: 2.16 K/UL
LYMPHOCYTES NFR BLD AUTO: 36.8 %
MAN DIFF?: NORMAL
MCHC RBC-ENTMCNC: 31.8 GM/DL
MCHC RBC-ENTMCNC: 32.3 PG
MCV RBC AUTO: 101.6 FL
MONOCYTES # BLD AUTO: 0.54 K/UL
MONOCYTES NFR BLD AUTO: 9.2 %
NEUTROPHILS # BLD AUTO: 2.87 K/UL
NEUTROPHILS NFR BLD AUTO: 48.8 %
NITRITE URINE: NEGATIVE
NONHDLC SERPL-MCNC: 119 MG/DL
PH URINE: 6
PLATELET # BLD AUTO: 242 K/UL
POTASSIUM SERPL-SCNC: 4.4 MMOL/L
PROT SERPL-MCNC: 6.7 G/DL
PROTEIN URINE: NEGATIVE
RBC # BLD: 4.33 M/UL
RBC # FLD: 13.5 %
SODIUM SERPL-SCNC: 140 MMOL/L
SPECIFIC GRAVITY URINE: 1.02
TRIGL SERPL-MCNC: 156 MG/DL
TSH SERPL-ACNC: 1.86 UIU/ML
UROBILINOGEN URINE: NORMAL
VIT B12 SERPL-MCNC: 390 PG/ML
WBC # FLD AUTO: 5.87 K/UL

## 2021-04-22 ENCOUNTER — RX RENEWAL (OUTPATIENT)
Age: 86
End: 2021-04-22

## 2021-05-06 ENCOUNTER — RX RENEWAL (OUTPATIENT)
Age: 86
End: 2021-05-06

## 2021-05-27 ENCOUNTER — RX RENEWAL (OUTPATIENT)
Age: 86
End: 2021-05-27

## 2021-06-02 ENCOUNTER — APPOINTMENT (OUTPATIENT)
Dept: INTERNAL MEDICINE | Facility: CLINIC | Age: 86
End: 2021-06-02
Payer: MEDICARE

## 2021-06-02 ENCOUNTER — NON-APPOINTMENT (OUTPATIENT)
Age: 86
End: 2021-06-02

## 2021-06-02 VITALS
SYSTOLIC BLOOD PRESSURE: 114 MMHG | DIASTOLIC BLOOD PRESSURE: 58 MMHG | OXYGEN SATURATION: 95 % | HEIGHT: 60 IN | HEART RATE: 67 BPM | TEMPERATURE: 98.2 F

## 2021-06-02 DIAGNOSIS — M19.049 PRIMARY OSTEOARTHRITIS, UNSPECIFIED HAND: ICD-10-CM

## 2021-06-02 DIAGNOSIS — R07.89 OTHER CHEST PAIN: ICD-10-CM

## 2021-06-02 PROCEDURE — 99214 OFFICE O/P EST MOD 30 MIN: CPT

## 2021-06-02 NOTE — HISTORY OF PRESENT ILLNESS
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 \par \par Alzheimers dementia- depression - now with delusion and hallucination - worsening was once every other month now 2-3 time a monthh \par -on escitalopram 20 po daily since 2018 - forgetting names - stays up at night , forgets to eat food - sits there needs reminding \par -- in past was seen by gerontologist-now on Namenda, her daughter is neuropsychiatric who thought it was too strong for her , needs help with basic activities of daily living , get help from her daughters and son, her daughter manages her billing / shopping\par -Denies any history of falls or getting lost\par -Has problem with recollecting dates, and learning new things \par -she use to go to [facility], with  , but stopped after his death , looking to find one which will suit her needs , doing crossword puzzles \par \par Glaucoma-Sees ophthalmologist every 6 months uses eyedrops\par \par History of hand arthritis Since she was in her 30s- does not take any medication, significant deformities of her hands, arthritis panel negative \par \par h/o urinary incontinence -  around that episode and since then she has been feeling low depressed - and talks about being in a box and not waking up , sleep ok , appetite ok , low mood \par - wearing pads for accidents for 1 yr \par \par  hearing impairment More, left ear- saw hearing center did testing \par -Lives with daughter and son \par

## 2021-06-02 NOTE — ASSESSMENT
[FreeTextEntry1] : Memory loss/ depression - with episode of delusion and hallucination - increasing frequency - delusion no episode since 9/2020 \par - advised to see [facility] for evaluation - referral and contact info given \par - MRI brain 3/2020 reviewed \par -on escitalopram 20 mg - continue same dose tolerating w/u side effetcs \par - stopped melatonin due to diarrhea \par - advised to monitor if worsening to consider Namenda , \par -Advise patient to join community Center to keep herself busy, Involve in activities\par -f/u 3-6 months \par \par Alz dementia- [facility]- 12/30 -stable\par -Namenda 5 mg 2 tab am and 1 tab pm twice daily( since 6/2020)\par f/u 3 months \par \par Severe hand deformities/Arthritis- walking with rollator \par -Balance issues , lower ext weakness - pt high risk of fall Dw son deferred in home / out side PT due to pandemic - printed home exercises to do he will supervise \par -Most probably OA , Arthritis panel negative \par \par freckles , moles- wart left fore arm \par -dermatology consult reviewed. \par \par Health maintenance\par Pneumovax 2009 \par  Flu vaccine-9/18/2020\par Deferred screening discussed with family son. \par  got both doses for Pfizer vaccine last was 2/25/21 \par Pfizer completed in 3/2021

## 2021-07-23 ENCOUNTER — RX RENEWAL (OUTPATIENT)
Age: 86
End: 2021-07-23

## 2021-10-12 ENCOUNTER — APPOINTMENT (OUTPATIENT)
Dept: INTERNAL MEDICINE | Facility: CLINIC | Age: 86
End: 2021-10-12
Payer: MEDICARE

## 2021-10-12 VITALS
OXYGEN SATURATION: 96 % | HEART RATE: 61 BPM | SYSTOLIC BLOOD PRESSURE: 130 MMHG | TEMPERATURE: 98.1 F | DIASTOLIC BLOOD PRESSURE: 70 MMHG

## 2021-10-12 DIAGNOSIS — Z86.39 PERSONAL HISTORY OF OTHER ENDOCRINE, NUTRITIONAL AND METABOLIC DISEASE: ICD-10-CM

## 2021-10-12 PROCEDURE — 90662 IIV NO PRSV INCREASED AG IM: CPT

## 2021-10-12 PROCEDURE — G0008: CPT

## 2021-10-12 PROCEDURE — 99214 OFFICE O/P EST MOD 30 MIN: CPT | Mod: 25

## 2021-10-12 NOTE — HISTORY OF PRESENT ILLNESS
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 has f/u later this month \par \par Alzheimers dementia- depression - now with delusion and hallucination - worsening was once every other month now 2-3 time a month\par -4 episode since 6/2021  \par -on escitalopram 20 po daily since 2018 - forgetting names - stays up at night , forgets to eat food - sits there needs reminding \par -- in past was seen by gerontologist-now on Namenda, her daughter is neuropsychiatric who thought it was too strong for her , needs help with basic activities of daily living , get help from her daughters and son, her daughter manages her billing / shopping\par -Denies any history of falls or getting lost\par -Has problem with recollecting dates, and learning new things \par -she use to go to [facility], with  , but stopped after his death , looking to find one which will suit her needs , doing crossword puzzles \par \par Glaucoma-Sees ophthalmologist every 6 months uses eyedrops\par \par History of hand arthritis Since she was in her 30s- does not take any medication, significant deformities of her hands, arthritis panel negative \par \par h/o urinary incontinence - around that episode and since then she has been feeling low depressed - and talks about being in a box and not waking up , sleep ok , appetite ok , low mood \par - wearing pads for accidents for 1 yr \par \par  hearing impairment More, left ear- saw hearing center did testing \par -Lives  son , daughter now in assisted living after her seizure sx temporal lobectomy - resulted in severe dementia \par

## 2021-10-12 NOTE — ASSESSMENT
[FreeTextEntry1] : Memory loss/ depression - with episode of delusion and hallucination - increasing frequency -frequent delusion episodes \par - advised to see [facility] for evaluation - referral and contact info given \par - MRI brain 3/2020 reviewed \par -on escitalopram 20 mg - continue same dose tolerating w/u side effetcs \par - stopped melatonin due to diarrhea \par - advised to monitor if worsening to consider Namenda , \par -Advise patient to join community Center to keep herself busy, Involve in activities\par -f/u 3-6 months \par \par Alz dementia- [facility]ms- 12/30 -with delusional episodes - tolerable as per son - no violence \par -Namenda 5 mg 2 tab am and 1 tab pm twice daily( since 6/2020)\par f/u 3 months \par \par Severe hand deformities/Arthritis- walking with rollator - in wheel chair now using out side \par -Balance issues , lower ext weakness - pt high risk of fall Dw son deferred in home / out side PT due to pandemic - printed home exercises to do he will supervise \par -Most probably OA , Arthritis panel negative \par \par freckles , moles- wart left fore arm \par -dermatology consult reviewed. \par \par changed Statin to 5 mg get BW next visit \par \par Health maintenance\par Pneumovax 2009 \par  Flu vaccine-10/12/21 \par Deferred screening discussed with family son. \par  got both doses for Pfizer vaccine last was 2/25/21 \par Pfizer completed in 3/2021. got booster 10/2021 \par

## 2021-11-07 ENCOUNTER — RX RENEWAL (OUTPATIENT)
Age: 86
End: 2021-11-07

## 2021-11-17 ENCOUNTER — RX RENEWAL (OUTPATIENT)
Age: 86
End: 2021-11-17

## 2022-03-25 ENCOUNTER — APPOINTMENT (OUTPATIENT)
Dept: INTERNAL MEDICINE | Facility: CLINIC | Age: 87
End: 2022-03-25
Payer: MEDICARE

## 2022-03-25 VITALS
SYSTOLIC BLOOD PRESSURE: 104 MMHG | TEMPERATURE: 98.3 F | OXYGEN SATURATION: 98 % | BODY MASS INDEX: 24.35 KG/M2 | HEART RATE: 72 BPM | HEIGHT: 60 IN | DIASTOLIC BLOOD PRESSURE: 57 MMHG | WEIGHT: 124 LBS

## 2022-03-25 PROCEDURE — G0442 ANNUAL ALCOHOL SCREEN 15 MIN: CPT

## 2022-03-25 PROCEDURE — G0439: CPT

## 2022-03-25 NOTE — ASSESSMENT
[FreeTextEntry1] : Memory loss/ depression - with episode of delusion and hallucination - increasing frequency -frequent delusion episodes \par - advised to see [facility] for evaluation - referral and contact info given \par - MRI brain 3/2020 reviewed \par -on escitalopram 20 mg - continue same dose tolerating w/u side effetcs \par - stopped melatonin due to diarrhea \par - advised to monitor if worsening to consider Namenda , \par -Advise patient to join community Center to keep herself busy, Involve in activities\par -f/u 3-6 months \par \par Alz dementia-with delusional episodes - tolerable as per son - no violence \par -Namenda 5 mg 2 tab am and 1 tab pm twice daily( since 6/2020)\par f/u 3 months \par \par Severe hand deformities/Arthritis- walking with rollator - in wheel chair now using out side \par -Balance issues , lower ext weakness - pt high risk of fall Dw son deferred in home / out side PT due to pandemic - printed home exercises to do he will supervise \par -Most probably OA , Arthritis panel negative \par \par freckles , moles- wart left fore arm \par -dermatology consult reviewed. \par \par changed Statin to 5 mg get BW next visit \par \par Health maintenance\par Pneumovax 2009 \par  Flu vaccine- 10/12/21 \par Deferred screening discussed with family son. \par Pfizer completed in  2/25/21 ,  3/2021. got booster 10/2021 \par \par dermatology for skin mole changes and skin tag irritated

## 2022-03-25 NOTE — REVIEW OF SYSTEMS
[Confusion] : confusion [Memory Loss] : memory loss [Unsteady Walking] : ataxia [Negative] : Heme/Lymph [de-identified] : see HPI

## 2022-03-25 NOTE — HEALTH RISK ASSESSMENT
[Fair] :  ~his/her~ mood as fair [Never] : Never [No] : No [One fall no injury in past year] : Patient reported one fall in the past year without injury [Patient not ambulatory] : Patient is not ambulatory [Assistive Device] : Patient uses an assistive device [Medical reason not done] : Medical reason not done [With Family] : lives with family [Retired] : retired [Single] : single [With Patient/Caregiver] : , with patient/caregiver [Designated Healthcare Proxy] : Designated healthcare proxy [Name: ___] : Health Care Proxy's Name: [unfilled]  [Relationship: ___] : Relationship: [unfilled] [de-identified] : limited bed bound  [de-identified] : wheel chair, cane  [de-identified] : Alz dementia  [AdvancecareDate] : 3/25/22

## 2022-03-25 NOTE — HISTORY OF PRESENT ILLNESS
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 has f/u later this month \par \par Alzheimers dementia- depression - now with delusion and hallucination - worsening was once every other month now 2-3 time a month\par -4 episode since 6/2021 \par - knows relation most of the times - but cannot remember names \par -on escitalopram 20 po daily since 2018 - forgetting names - stays up at night , forgets to eat food - sits there needs reminding \par -- in past was seen by gerontologist-now on Namenda, her daughter is neuropsychiatric who thought it was too strong for her , needs help with basic activities of daily living , get help from her daughters and son, her daughter manages her billing / shopping\par -Denies any history of falls or getting lost\par -Has problem with recollecting dates, and learning new things \par -[facility]  , but stopped after his death , looking to find one which will suit her needs , doing crossword puzzles \par \par Glaucoma-Sees ophthalmologist every 6 months uses eyedrops\par \par History of hand arthritis Since she was in her 30s- does not take any medication, significant deformities of her hands, arthritis panel negative \par \par h/o urinary incontinence - around that episode and since then she has been feeling low depressed - and talks about being in a box and not waking up , sleep ok , appetite ok , low mood \par - wearing pads for accidents for 1 yr \par \par  hearing impairment More, left ear- saw hearing center did testing \par -Lives son , daughter now in assisted living after her seizure sx temporal lobectomy - resulted in severe dementia \par

## 2022-03-28 LAB
25(OH)D3 SERPL-MCNC: 41.9 NG/ML
ALBUMIN SERPL ELPH-MCNC: 4 G/DL
ALP BLD-CCNC: 55 U/L
ALT SERPL-CCNC: 11 U/L
ANION GAP SERPL CALC-SCNC: 9 MMOL/L
AST SERPL-CCNC: 15 U/L
BASOPHILS # BLD AUTO: 0.02 K/UL
BASOPHILS NFR BLD AUTO: 0.4 %
BILIRUB SERPL-MCNC: 0.4 MG/DL
BUN SERPL-MCNC: 12 MG/DL
CALCIUM SERPL-MCNC: 9.4 MG/DL
CHLORIDE SERPL-SCNC: 107 MMOL/L
CHOLEST SERPL-MCNC: 195 MG/DL
CO2 SERPL-SCNC: 26 MMOL/L
CREAT SERPL-MCNC: 0.66 MG/DL
EGFR: 79 ML/MIN/1.73M2
EOSINOPHIL # BLD AUTO: 0.18 K/UL
EOSINOPHIL NFR BLD AUTO: 3.4 %
ESTIMATED AVERAGE GLUCOSE: 120 MG/DL
GLUCOSE SERPL-MCNC: 99 MG/DL
HBA1C MFR BLD HPLC: 5.8 %
HCT VFR BLD CALC: 42.4 %
HDLC SERPL-MCNC: 77 MG/DL
HGB BLD-MCNC: 13.6 G/DL
IMM GRANULOCYTES NFR BLD AUTO: 0.2 %
LDLC SERPL CALC-MCNC: 99 MG/DL
LYMPHOCYTES # BLD AUTO: 1.95 K/UL
LYMPHOCYTES NFR BLD AUTO: 36.7 %
MAN DIFF?: NORMAL
MCHC RBC-ENTMCNC: 31.9 PG
MCHC RBC-ENTMCNC: 32.1 GM/DL
MCV RBC AUTO: 99.3 FL
MONOCYTES # BLD AUTO: 0.56 K/UL
MONOCYTES NFR BLD AUTO: 10.5 %
NEUTROPHILS # BLD AUTO: 2.6 K/UL
NEUTROPHILS NFR BLD AUTO: 48.8 %
NONHDLC SERPL-MCNC: 119 MG/DL
PLATELET # BLD AUTO: 217 K/UL
POTASSIUM SERPL-SCNC: 4.8 MMOL/L
PROT SERPL-MCNC: 6.3 G/DL
RBC # BLD: 4.27 M/UL
RBC # FLD: 13.6 %
SODIUM SERPL-SCNC: 141 MMOL/L
TRIGL SERPL-MCNC: 96 MG/DL
TSH SERPL-ACNC: 2.25 UIU/ML
VIT B12 SERPL-MCNC: 1289 PG/ML
WBC # FLD AUTO: 5.32 K/UL

## 2022-05-02 ENCOUNTER — NON-APPOINTMENT (OUTPATIENT)
Age: 87
End: 2022-05-02

## 2022-05-03 ENCOUNTER — NON-APPOINTMENT (OUTPATIENT)
Age: 87
End: 2022-05-03

## 2022-05-26 ENCOUNTER — RX RENEWAL (OUTPATIENT)
Age: 87
End: 2022-05-26

## 2022-07-13 ENCOUNTER — NON-APPOINTMENT (OUTPATIENT)
Age: 87
End: 2022-07-13

## 2022-07-15 NOTE — PLAN
[FreeTextEntry1] : 89 y/o female COVID infection\par hx of dementia, glaucoma, depression\par COVID + test today 7/15\par Was negative on 7/12\par Symptoms started 7/14, nasal congestion, decreased energy\par Appetite is variable at baseline\par Discussed with son\par HIgh risk for progression to severe illness due to frailty\par Paxlovid reviewed with son including risks, benefits and side effects.  \par Patient's current medications reviewed.  Interactions checked, last dose of simvastatin was last evenign 7/14, she will hold it for the coming 10 days (until off paxlovid x 5 days)\par Patient agrees to proceed with Paxlovid treatment, sent to local pharmacy who has it in stock.  \par \par Advised careful monitoring of temperature, symptoms, especially SOB/cough, and in this frail patient, po intake.  Should limit contact with others, increase hydration and rest, tylenol for fever, myalgia. Should call if symptoms worsen or if questions arise. \par

## 2022-07-18 ENCOUNTER — NON-APPOINTMENT (OUTPATIENT)
Age: 87
End: 2022-07-18

## 2022-07-18 NOTE — PLAN
[FreeTextEntry1] : 89 y/o female COVID infection\par hx of dementia, glaucoma, depression\par COVID + test 7/15\par COVID Day 4\par On Paxlovid\par Has diarrhea, presumabaly from paxlovid, is incontient of stool (always)\par Otherwise at baseline\par Disucssed with son, if diarrhea is an issue, can stop paxlovid given mild COVID illness.\par Advised careful monitoring of temperature, symptoms, especially SOB/cough, and in this frail patient, po intake.  Should limit contact with others, increase hydration and rest, tylenol for fever, myalgia. Should call if symptoms worsen or if questions arise. \par

## 2022-07-29 ENCOUNTER — APPOINTMENT (OUTPATIENT)
Dept: INTERNAL MEDICINE | Facility: CLINIC | Age: 87
End: 2022-07-29

## 2022-07-29 VITALS
SYSTOLIC BLOOD PRESSURE: 94 MMHG | RESPIRATION RATE: 14 BRPM | DIASTOLIC BLOOD PRESSURE: 65 MMHG | TEMPERATURE: 97.9 F | OXYGEN SATURATION: 97 % | HEART RATE: 79 BPM

## 2022-07-29 DIAGNOSIS — Z86.39 PERSONAL HISTORY OF OTHER ENDOCRINE, NUTRITIONAL AND METABOLIC DISEASE: ICD-10-CM

## 2022-07-29 PROCEDURE — 99214 OFFICE O/P EST MOD 30 MIN: CPT | Mod: 25

## 2022-07-29 PROCEDURE — 36415 COLL VENOUS BLD VENIPUNCTURE: CPT

## 2022-07-29 RX ORDER — NIRMATRELVIR AND RITONAVIR 300-100 MG
20 X 150 MG & KIT ORAL
Qty: 1 | Refills: 0 | Status: COMPLETED | COMMUNITY
Start: 2022-07-15 | End: 2022-07-29

## 2022-07-29 NOTE — HISTORY OF PRESENT ILLNESS
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 has f/u later this month \par \par Alzheimers dementia- depression - now with delusion and hallucination - worsening was once every other month now 2-3 time a month\par -4 episode since 6/2021 \par - knows relation most of the times - but cannot remember names \par -on escitalopram 20 po daily since 2018 - forgetting names - stays up at night , forgets to eat food - sits there needs reminding \par -- in past was seen by gerontologist-now on Namenda, her daughter is neuropsychiatric who thought it was too strong for her , needs help with basic activities of daily living , get help from her daughters and son, her daughter manages her billing / shopping\par -Denies any history of falls or getting lost\par -Has problem with recollecting dates, and learning new things \par -she use to go to [facility], with  , but stopped after his death , looking to find one which will suit her needs , doing crossword puzzles \par \par Glaucoma-Sees ophthalmologist every 6 months uses eyedrops\par \par History of hand arthritis Since she was in her 30s- does not take any medication, significant deformities of her hands, arthritis panel negative \par \par h/o urinary incontinence - around that episode and since then she has been feeling low depressed - and talks about being in a box and not waking up , sleep ok , appetite ok , low mood \par - wearing pads for accidents for 1 yr \par \par  hearing impairment More, left ear- saw hearing center did testing \par -Lives son , daughter now in assisted living after her seizure sx temporal lobectomy - resulted in severe dementia \par

## 2022-07-29 NOTE — ASSESSMENT
[FreeTextEntry1] : s/p covid 7/15/22 - resolved \par -get cbc cmp \par \par Memory loss/ depression - with episode of delusion and hallucination - increasing frequency -frequent delusion episodes , fecal incontinence occ \par - advised to see [facility] for evaluation - referral and contact info given \par - MRI brain 3/2020 reviewed \par -on escitalopram 20 mg - continue same dose tolerating w/u side effetcs \par - stopped melatonin due to diarrhea \par - advised to monitor if worsening to consider Namenda , \par -Advise patient to join community Center to keep herself busy, Involve in activities\par -f/u 3-6 months \par \par Alz dementia-with delusional episodes - tolerable as per son - no violence \par -Namenda 5 mg 2 tab am and 1 tab pm twice daily( since 6/2020)\par - puree diet \par f/u 3 months \par \par Severe hand deformities/Arthritis- walking with rollator - in wheel chair now using out side \par -Balance issues , lower ext weakness - pt high risk of fall Dw son deferred in home / out side PT due to pandemic - printed home exercises to do he will supervise \par -Most probably OA , Arthritis panel negative \par \par freckles , moles- wart left fore arm \par -dermatology consult reviewed. \par \par changed Statin to 5 mg lipids 3/2022 nl \par \par Health maintenance\par Pneumovax 2009 \par  Flu vaccine- 10/12/21 \par Deferred screening discussed with family son. \par Pfizer completed in 2/25/21 , 3/2021. got booster 10/2021 \par \par dermatology for skin mole changes and skin tag irritated. \par

## 2022-07-31 ENCOUNTER — RX RENEWAL (OUTPATIENT)
Age: 87
End: 2022-07-31

## 2022-07-31 LAB
ALBUMIN SERPL ELPH-MCNC: 4 G/DL
ALP BLD-CCNC: 62 U/L
ALT SERPL-CCNC: 33 U/L
ANION GAP SERPL CALC-SCNC: 11 MMOL/L
AST SERPL-CCNC: 24 U/L
BASOPHILS # BLD AUTO: 0.02 K/UL
BASOPHILS NFR BLD AUTO: 0.3 %
BILIRUB SERPL-MCNC: 0.3 MG/DL
BUN SERPL-MCNC: 16 MG/DL
CALCIUM SERPL-MCNC: 9.4 MG/DL
CHLORIDE SERPL-SCNC: 104 MMOL/L
CO2 SERPL-SCNC: 26 MMOL/L
CREAT SERPL-MCNC: 0.74 MG/DL
EGFR: 73 ML/MIN/1.73M2
EOSINOPHIL # BLD AUTO: 0.19 K/UL
EOSINOPHIL NFR BLD AUTO: 2.6 %
GLUCOSE SERPL-MCNC: 100 MG/DL
HCT VFR BLD CALC: 43.1 %
HGB BLD-MCNC: 14.1 G/DL
IMM GRANULOCYTES NFR BLD AUTO: 0.3 %
LYMPHOCYTES # BLD AUTO: 2.74 K/UL
LYMPHOCYTES NFR BLD AUTO: 36.8 %
MAN DIFF?: NORMAL
MCHC RBC-ENTMCNC: 32.7 GM/DL
MCHC RBC-ENTMCNC: 32.7 PG
MCV RBC AUTO: 100 FL
MONOCYTES # BLD AUTO: 0.73 K/UL
MONOCYTES NFR BLD AUTO: 9.8 %
NEUTROPHILS # BLD AUTO: 3.75 K/UL
NEUTROPHILS NFR BLD AUTO: 50.2 %
PLATELET # BLD AUTO: 279 K/UL
POTASSIUM SERPL-SCNC: 4.3 MMOL/L
PROT SERPL-MCNC: 7 G/DL
RBC # BLD: 4.31 M/UL
RBC # FLD: 12.8 %
SODIUM SERPL-SCNC: 142 MMOL/L
WBC # FLD AUTO: 7.45 K/UL

## 2022-09-23 ENCOUNTER — RX RENEWAL (OUTPATIENT)
Age: 87
End: 2022-09-23

## 2022-11-08 ENCOUNTER — APPOINTMENT (OUTPATIENT)
Dept: INTERNAL MEDICINE | Facility: CLINIC | Age: 87
End: 2022-11-08

## 2022-11-08 DIAGNOSIS — L57.0 ACTINIC KERATOSIS: ICD-10-CM

## 2022-11-08 PROCEDURE — 99213 OFFICE O/P EST LOW 20 MIN: CPT | Mod: 95

## 2022-11-08 NOTE — HISTORY OF PRESENT ILLNESS
[Home] : at home, [unfilled] , at the time of the visit. [Medical Office: ([facility])___] : at the medical office located in  [Other:____] : [unfilled] [Verbal consent obtained from patient] : the patient, [unfilled] [de-identified] : This is a 89 -year-old female with past medical history of glaucoma, hand arthritis, hyperlipidemia , recent memory loss, who is accompanied by her son seen for f/u on ch medical issues \par \par wheelchair bound now \par \par stays in bed most of the day - on and off - some good days - some in bed days - will delusions\par - no falls since last visit \par Ongoing memory loss- MRI Brain 3/2020 - 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 has f/u later this month \par \par Alzheimers dementia- depression - now with delusion and hallucination - worsening was once every other month now 2-3 time a month\par -episodic on and off \par - knows relation most of the times - but cannot remember names \par -on escitalopram 20 po daily since 2018 - forgetting names - stays up at night , forgets to eat food - sits there needs reminding \par -- in past was seen by gerontologist-now on Namenda, her daughter is neuropsychiatric who thought it was too strong for her , needs help with basic activities of daily living , get help from her daughters and son, her daughter manages her billing / shopping\par -Denies any history of falls or getting lost\par -Has problem with recollecting dates, and learning new things \par -she use to go to [facility], with  , but stopped after his death , looking to find one which will suit her needs , doing crossword puzzles \par \par Glaucoma-Sees ophthalmologist every 6 months uses eyedrops\par \par History of hand arthritis Since she was in her 30s- does not take any medication, significant deformities of her hands, arthritis panel negative \par \par h/o urinary incontinence - around that episode and since then she has been feeling low depressed - and talks about being in a box and not waking up , sleep ok , appetite ok , low mood \par - wearing pads for accidents for 1 yr \par \par  hearing impairment More, left ear- saw hearing center did testing \par -Lives son , daughter now in assisted living after her seizure sx temporal lobectomy - resulted in severe dementia \par

## 2022-11-08 NOTE — PHYSICAL EXAM
[No Acute Distress] : no acute distress [de-identified] : memory loss , awake , alert , confused

## 2022-11-08 NOTE — ASSESSMENT
[FreeTextEntry1] : \par Memory loss/ depression - with episode of delusion and hallucination - increasing frequency -frequent delusion episodes , fecal incontinence occ \par - advised to see [facility] for evaluation - referral and contact info given \par - MRI brain 3/2020 reviewed \par -on escitalopram 20 mg - continue same dose tolerating w/u side effetcs \par - stopped melatonin due to diarrhea \par - advised to monitor if worsening to consider Namenda , \par -Advise patient to join community Center to keep herself busy, Involve in activities\par -f/u 3-6 months \par \par Alz dementia-with delusional episodes - tolerable as per son - no violence \par -Namenda 5 mg 2 tab am and 1 tab pm twice daily( since 6/2020)\par - puree diet \par f/u 3 months \par \par Severe hand deformities/Arthritis- walking with rollator - in wheel chair now using out side \par -Balance issues , lower ext weakness - pt high risk of fall Dw son deferred in home / out side PT due to pandemic - printed home exercises to do he will supervise \par -Most probably OA , Arthritis panel negative \par \par freckles , moles- wart left fore arm \par -dermatology consult reviewed. \par \par changed Statin to 5 mg lipids 3/2022 nl \par \par Health maintenance\par Pneumovax 2009 \par  Flu vaccine- 10/12/21 --got 10/2022 at pharmacy \par Deferred screening discussed with family son. \par Pfizer completed in 2/25/21 , 3/2021. got booster 10/2021 10/2022 \par \par dermatology for skin mole changes and skin tag irritated. \par

## 2022-11-09 ENCOUNTER — APPOINTMENT (OUTPATIENT)
Dept: DERMATOLOGY | Facility: CLINIC | Age: 87
End: 2022-11-09

## 2022-12-22 ENCOUNTER — RX RENEWAL (OUTPATIENT)
Age: 87
End: 2022-12-22

## 2023-01-19 ENCOUNTER — RX RENEWAL (OUTPATIENT)
Age: 88
End: 2023-01-19

## 2023-04-24 ENCOUNTER — RX RENEWAL (OUTPATIENT)
Age: 88
End: 2023-04-24

## 2023-05-01 ENCOUNTER — APPOINTMENT (OUTPATIENT)
Dept: INTERNAL MEDICINE | Facility: CLINIC | Age: 88
End: 2023-05-01
Payer: MEDICARE

## 2023-05-01 VITALS — HEIGHT: 60 IN | DIASTOLIC BLOOD PRESSURE: 70 MMHG | SYSTOLIC BLOOD PRESSURE: 100 MMHG

## 2023-05-01 DIAGNOSIS — Z00.00 ENCOUNTER FOR GENERAL ADULT MEDICAL EXAMINATION W/OUT ABNORMAL FINDINGS: ICD-10-CM

## 2023-05-01 PROCEDURE — G0439: CPT

## 2023-05-01 NOTE — ASSESSMENT
[FreeTextEntry1] : \par Memory loss/ depression - with episode of delusion and hallucination - increasing frequency -frequent delusion episodes , fecal incontinence occ \par - advised to see [facility] for evaluation - referral and contact info given \par - MRI brain 3/2020 reviewed \par -on escitalopram 20 mg - continue same dose tolerating w/u side effects \par - advised to monitor if worsening to consider Namenda , \par -Advise patient to join community Center to keep herself busy, Involve in activities\par -f/u 3-6 months \par \par Alz dementia-with delusional episodes - tolerable as per son - no violence \par -Namenda 5 mg 2 tab am and 1 tab pm twice daily( since 6/2020)\par - puree diet \par f/u 3 months \par \par Severe hand deformities/Arthritis- walking with rollator - in wheel chair now using out side \par -Balance issues , lower ext weakness - pt high risk of fall Dw son deferred in home / out side PT due to pandemic - printed home exercises to do he will supervise \par -Most probably OA , Arthritis panel negative \par \par freckles , moles- wart left fore arm \par -dermatology consult reviewed. \par \par changed Statin to 5 mg lipids 3/2022 nl \par \par Health maintenance\par Pneumovax 2009 \par  Flu vaccine- 10/12/21 --got 10/2022 at pharmacy \par Deferred screening discussed with family son. \par Pfizer completed in 2/25/21 , 3/2021. got booster 10/2021 10/2022 \par \par dermatology for skin mole changes and skin tag irritated.

## 2023-05-01 NOTE — REVIEW OF SYSTEMS
[Fatigue] : fatigue [Vision Problems] : vision problems [Hearing Loss] : hearing loss [Incontinence] : incontinence [Memory Loss] : memory loss [Unsteady Walking] : ataxia [Insomnia] : insomnia [Negative] : Psychiatric [Dizziness] : no dizziness [FreeTextEntry3] : followed by Elmer [facility]ckinnon  [FreeTextEntry8] : Weres diapers  [FreeTextEntry9] : lower leg weakness , PAin joints

## 2023-05-01 NOTE — HEALTH RISK ASSESSMENT
[Fair] :  ~his/her~ mood as fair [No] : In the past 12 months have you used drugs other than those required for medical reasons? No [No falls in past year] : Patient reported no falls in the past year [Other reason not done] : Other reason not done [With Family] : lives with family [Single] : single [Full assistance needed] : managing finances [Reports changes in hearing] : Reports changes in hearing [Reports changes in vision] : Reports changes in vision [de-identified] : bed bound  [de-identified] : Pt demented  [Reports changes in dental health] : Reports no changes in dental health [de-identified] : Son  [AdvancecareDate] : 5/1/23  [FreeTextEntry4] : Designated healthcare proxy. Health Care Proxy's Name: Alicia Bence-Garnde -[phone] . Relationship: Daughter . \par

## 2023-05-01 NOTE — HISTORY OF PRESENT ILLNESS
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 has f/u later this month \par \par Alzheimers dementia- depression - now with delusion and hallucination - worsening was once every other month now 2-3 time a month\par -episodic on and off \par - knows relation most of the times - but cannot remember names \par -on escitalopram 20 po daily since 2018 - forgetting names - stays up at night , forgets to eat food - sits there needs reminding \par -- in past was seen by gerontologist-now on Namenda, her daughter is neuropsychiatric who thought it was too strong for her , needs help with basic activities of daily living , get help from her daughters and son, her daughter manages her billing / shopping\par -Denies any history of falls or getting lost\par -Has problem with recollecting dates, and learning new things \par -[facility]  , but stopped after his death , looking to find one which will suit her needs , doing crossword puzzles \par \par Glaucoma-Sees ophthalmologist every 6 months uses eyedrops\par \par History of hand arthritis Since she was in her 30s- does not take any medication, significant deformities of her hands, arthritis panel negative \par \par h/o urinary incontinence - around that episode and since then she has been feeling low depressed - and talks about being in a box and not waking up , sleep ok , appetite ok , low mood \par - wearing pads for accidents for 1 yr \par \par  hearing impairment More, left ear- saw hearing center did testing \par -Lives son , daughter now in assisted living after her seizure sx temporal lobectomy - resulted in severe dementia \par

## 2023-05-01 NOTE — PHYSICAL EXAM
[Normal] : affect was normal and insight and judgment were intact [de-identified] : in wheel chair  [de-identified] : Dry and thin

## 2023-05-02 LAB
ALBUMIN SERPL ELPH-MCNC: 4.2 G/DL
ALP BLD-CCNC: 68 U/L
ALT SERPL-CCNC: 11 U/L
ANION GAP SERPL CALC-SCNC: 11 MMOL/L
AST SERPL-CCNC: 20 U/L
BASOPHILS # BLD AUTO: 0.03 K/UL
BASOPHILS NFR BLD AUTO: 0.4 %
BILIRUB SERPL-MCNC: 0.3 MG/DL
BUN SERPL-MCNC: 15 MG/DL
CALCIUM SERPL-MCNC: 9.6 MG/DL
CHLORIDE SERPL-SCNC: 106 MMOL/L
CHOLEST SERPL-MCNC: 190 MG/DL
CO2 SERPL-SCNC: 27 MMOL/L
CREAT SERPL-MCNC: 0.77 MG/DL
EGFR: 69 ML/MIN/1.73M2
EOSINOPHIL # BLD AUTO: 0.17 K/UL
EOSINOPHIL NFR BLD AUTO: 2.2 %
GLUCOSE SERPL-MCNC: 102 MG/DL
HCT VFR BLD CALC: 45.6 %
HDLC SERPL-MCNC: 73 MG/DL
HGB BLD-MCNC: 14.8 G/DL
IMM GRANULOCYTES NFR BLD AUTO: 0.4 %
LDLC SERPL CALC-MCNC: 100 MG/DL
LYMPHOCYTES # BLD AUTO: 1.85 K/UL
LYMPHOCYTES NFR BLD AUTO: 24.4 %
MAN DIFF?: NORMAL
MCHC RBC-ENTMCNC: 32.5 GM/DL
MCHC RBC-ENTMCNC: 33.1 PG
MCV RBC AUTO: 102 FL
MONOCYTES # BLD AUTO: 0.65 K/UL
MONOCYTES NFR BLD AUTO: 8.6 %
NEUTROPHILS # BLD AUTO: 4.85 K/UL
NEUTROPHILS NFR BLD AUTO: 64 %
NONHDLC SERPL-MCNC: 117 MG/DL
PLATELET # BLD AUTO: 229 K/UL
POTASSIUM SERPL-SCNC: 4.3 MMOL/L
PROT SERPL-MCNC: 6.9 G/DL
RBC # BLD: 4.47 M/UL
RBC # FLD: 12.8 %
SODIUM SERPL-SCNC: 144 MMOL/L
TRIGL SERPL-MCNC: 83 MG/DL
TSH SERPL-ACNC: 1.47 UIU/ML
VIT B12 SERPL-MCNC: 1571 PG/ML
WBC # FLD AUTO: 7.58 K/UL

## 2023-06-25 ENCOUNTER — RX RENEWAL (OUTPATIENT)
Age: 88
End: 2023-06-25

## 2023-11-01 ENCOUNTER — APPOINTMENT (OUTPATIENT)
Dept: INTERNAL MEDICINE | Facility: CLINIC | Age: 88
End: 2023-11-01
Payer: MEDICARE

## 2023-11-01 DIAGNOSIS — R26.9 UNSPECIFIED ABNORMALITIES OF GAIT AND MOBILITY: ICD-10-CM

## 2023-11-01 PROCEDURE — 99442: CPT

## 2023-11-29 ENCOUNTER — NON-APPOINTMENT (OUTPATIENT)
Age: 88
End: 2023-11-29

## 2024-01-04 ENCOUNTER — RX RENEWAL (OUTPATIENT)
Age: 89
End: 2024-01-04

## 2024-01-14 ENCOUNTER — RX RENEWAL (OUTPATIENT)
Age: 89
End: 2024-01-14

## 2024-03-25 ENCOUNTER — RX RENEWAL (OUTPATIENT)
Age: 89
End: 2024-03-25

## 2024-04-16 ENCOUNTER — RX RENEWAL (OUTPATIENT)
Age: 89
End: 2024-04-16

## 2024-04-22 DIAGNOSIS — Z11.59 ENCOUNTER FOR SCREENING FOR OTHER VIRAL DISEASES: ICD-10-CM

## 2024-04-22 DIAGNOSIS — Z92.29 PERSONAL HISTORY OF OTHER DRUG THERAPY: ICD-10-CM

## 2024-04-22 DIAGNOSIS — L84 CORNS AND CALLOSITIES: ICD-10-CM

## 2024-04-22 DIAGNOSIS — Z86.03 PERSONAL HISTORY OF NEOPLASM OF UNCERTAIN BEHAVIOR: ICD-10-CM

## 2024-04-22 DIAGNOSIS — U07.1 COVID-19: ICD-10-CM

## 2024-04-23 ENCOUNTER — APPOINTMENT (OUTPATIENT)
Dept: HOME HEALTH SERVICES | Facility: HOME HEALTH | Age: 89
End: 2024-04-23
Payer: MEDICARE

## 2024-04-23 VITALS
SYSTOLIC BLOOD PRESSURE: 120 MMHG | OXYGEN SATURATION: 97 % | DIASTOLIC BLOOD PRESSURE: 60 MMHG | RESPIRATION RATE: 16 BRPM | HEART RATE: 72 BPM

## 2024-04-23 DIAGNOSIS — Z78.9 OTHER SPECIFIED HEALTH STATUS: ICD-10-CM

## 2024-04-23 DIAGNOSIS — G30.9 ALZHEIMER'S DISEASE, UNSPECIFIED: ICD-10-CM

## 2024-04-23 DIAGNOSIS — F22 DELUSIONAL DISORDERS: ICD-10-CM

## 2024-04-23 DIAGNOSIS — H44.513 ABSOLUTE GLAUCOMA, BILATERAL: ICD-10-CM

## 2024-04-23 DIAGNOSIS — I70.0 ATHEROSCLEROSIS OF AORTA: ICD-10-CM

## 2024-04-23 DIAGNOSIS — R32 UNSPECIFIED URINARY INCONTINENCE: ICD-10-CM

## 2024-04-23 DIAGNOSIS — F02.811 ALZHEIMER'S DISEASE, UNSPECIFIED: ICD-10-CM

## 2024-04-23 DIAGNOSIS — Z74.9 PROBLEM RELATED TO CARE PROVIDER DEPENDENCY, UNSPECIFIED: ICD-10-CM

## 2024-04-23 DIAGNOSIS — F32.A DEPRESSION, UNSPECIFIED: ICD-10-CM

## 2024-04-23 DIAGNOSIS — R79.89 OTHER SPECIFIED ABNORMAL FINDINGS OF BLOOD CHEMISTRY: ICD-10-CM

## 2024-04-23 DIAGNOSIS — F03.92 UNSPECIFIED DEMENTIA, UNSPECIFIED SEVERITY, WITH PSYCHOTIC DISTURBANCE: ICD-10-CM

## 2024-04-23 DIAGNOSIS — R73.01 IMPAIRED FASTING GLUCOSE: ICD-10-CM

## 2024-04-23 PROCEDURE — 99344 HOME/RES VST NEW MOD MDM 60: CPT | Mod: 25

## 2024-04-23 PROCEDURE — G0506: CPT

## 2024-04-23 RX ORDER — ESCITALOPRAM OXALATE 20 MG/1
20 TABLET ORAL
Qty: 90 | Refills: 3 | Status: ACTIVE | COMMUNITY
Start: 2017-02-13 | End: 1900-01-01

## 2024-04-23 RX ORDER — TRIAMCINOLONE ACETONIDE 1 MG/G
0.1 CREAM TOPICAL
Qty: 454 | Refills: 0 | Status: DISCONTINUED | COMMUNITY
Start: 2022-09-19 | End: 2024-04-23

## 2024-04-23 RX ORDER — DORZOLAMIDE HYDROCHLORIDE TIMOLOL MALEATE 20; 5 MG/ML; MG/ML
2-0.5 SOLUTION/ DROPS OPHTHALMIC TWICE DAILY
Qty: 2 | Refills: 11 | Status: ACTIVE | COMMUNITY
Start: 2018-06-18

## 2024-04-23 RX ORDER — MEMANTINE HYDROCHLORIDE 5 MG/1
5 TABLET, FILM COATED ORAL
Qty: 120 | Refills: 3 | Status: ACTIVE | COMMUNITY
Start: 2019-02-21

## 2024-04-24 PROBLEM — I70.0 AORTIC ATHEROSCLEROSIS: Status: ACTIVE | Noted: 2024-04-23

## 2024-04-24 PROBLEM — F03.92 DEMENTIA WITH PSYCHOSIS: Noted: 2020-09-18

## 2024-04-24 PROBLEM — F22 DELUSIONS: Status: ACTIVE | Noted: 2020-02-26

## 2024-04-24 PROBLEM — G30.9 ALZHEIMER'S DEMENTIA WITH AGITATION: Status: ACTIVE | Noted: 2019-11-20

## 2024-04-24 PROBLEM — Z74.9 DEPENDENCE ON CAREGIVER: Status: ACTIVE | Noted: 2024-04-24

## 2024-04-24 PROBLEM — R32 URINARY INCONTINENCE: Status: ACTIVE | Noted: 2017-02-13

## 2024-04-24 PROBLEM — R73.01 IMPAIRED FASTING GLUCOSE: Status: RESOLVED | Noted: 2020-09-18 | Resolved: 2024-04-24

## 2024-04-24 PROBLEM — Z78.9 PATIENT INCAPABLE OF MAKING INFORMED DECISIONS: Status: ACTIVE | Noted: 2024-04-24

## 2024-04-24 RX ORDER — BENZONATATE 100 MG/1
100 CAPSULE ORAL
Qty: 20 | Refills: 0 | Status: DISCONTINUED | COMMUNITY
Start: 2023-11-14

## 2024-04-24 RX ORDER — DOXYCYCLINE HYCLATE 100 MG/1
100 CAPSULE ORAL
Qty: 14 | Refills: 0 | Status: DISCONTINUED | COMMUNITY
Start: 2023-11-14

## 2024-04-24 RX ORDER — FLUTICASONE PROPIONATE 50 UG/1
50 SPRAY, METERED NASAL
Qty: 16 | Refills: 0 | Status: DISCONTINUED | COMMUNITY
Start: 2023-11-14

## 2024-04-24 NOTE — ASSESSMENT
[FreeTextEntry1] : Memory loss/ depression - with episode of delusion and hallucination - increasing frequency -frequent delusion episodes , fecal incontinence occ - advised to see [facility] psych for evaluation - referral and contact info given - MRI brain 3/2020 reviewed -on escitalopram 20 mg - continue same dose tolerating w/u side effects - advised to monitor if worsening to consider Namenda , -Advise patient to join community Center to keep herself busy, Involve in activities -f/u 3-6 months  Alz dementia-with delusional episodes - tolerable as per son - no violence -Namenda 5 mg 2 tab am and 1 tab pm twice daily( since 6/2020) - puree diet f/u 3 months  Severe hand deformities/Arthritis- walking with rollator - in wheel chair now using out side -Balance issues , lower ext weakness - pt high risk of fall Dw son deferred in home / out side PT due to pandemic - printed home exercises to do he will supervise -Most probably OA , Arthritis panel negative  freckles , moles- wart left fore arm -dermatology consult reviewed.  Health maintenance Pneumovax 2009 Flu vaccine- will get at pharmacy Deferred screening discussed with family son. Pfizer completed in 2/25/21 , 3/2021. got booster 10/2021 10/2022 dermatology for skin mole changes and skin tag irritated.

## 2024-04-24 NOTE — CHRONIC CARE ASSESSMENT
[Limited decision making ability] : limited decision making ability [Other: ____] : [unfilled] [Can not Exercise (Disability)] : Exercise: The patient can not exercise due to disability [de-identified] : pureed diet

## 2024-04-24 NOTE — COUNSELING
Detail Level: Simple [Use assistive device to avoid falls] : use assistive device to avoid falls [ - New patient with 2 or more chronic conditions; CCM discussed and patient-centered care plan established] : New patient with 2 or more chronic conditions; CCM discussed and patient-centered care plan established [Normal Weight - ( BMI  <25 )] : normal weight - ( BMI  <25 ) [Continue diet as tolerated] : continue diet as tolerated based on goals of care [Remove clutter and unsafe carpeting to avoid falls] : remove clutter and unsafe carpeting to avoid falls [] : diabetic screening [Minimize unnecessary interventions] : minimize unnecessary interventions [Maintain functional ability] : maintain functional ability [Discussed disease trajectory with patient/caregiver] : discussed disease trajectory with patient/caregiver [Likely to achieve goals/desired outcomes] : likely to achieve goals/desired outcomes [Patient/Caregiver has ___ understanding of disease process] : patient/caregiver has [unfilled] understanding of disease process [FreeTextEntry4] : Son wishes to avoid hospitals and keep patient comfortable [de-identified] : HCP daughter called to review [facility]- message left

## 2024-04-24 NOTE — HEALTH RISK ASSESSMENT
[No falls in past year] : Patient reported no falls in the past year [Yes] : The patient does have visual impairment [HRA Reviewed] : Health risk assessment reviewed [Some assistance needed] : walking [Full assistance needed] : managing finances [TimeGetUpGo] : 60 [de-identified] : needs walker and assistance to ambulate

## 2024-04-24 NOTE — REASON FOR VISIT
[Initial Annual Medicare Wellness Visit] : an initial annual Medicare wellness visit [Family Member] : family member [Formal Caregiver] : formal caregiver [Pre-Visit Preparation] : pre-visit preparation was done [Intercurrent Specialty/Sub-specialty Visits] : the patient has no intercurrent specialty/sub-specialty visits [FreeTextEntry2] : chart review

## 2024-04-24 NOTE — REVIEW OF SYSTEMS
[Memory Loss] : memory loss [Unsteady Walking] : ataxia [Negative] : Heme/Lymph [Confusion] : no confusion [FreeTextEntry1] : ERON w/ son

## 2024-04-24 NOTE — PHYSICAL EXAM
[No Acute Distress] : no acute distress [Normal Sclera/Conjunctiva] : normal sclera/conjunctiva [Normal Outer Ear/Nose] : the ears and nose were normal in appearance [No Respiratory Distress] : no respiratory distress [Clear to Auscultation] : lungs were clear to auscultation bilaterally [No Accessory Muscle Use] : no accessory muscle use [Normal Rate] : heart rate was normal  [Regular Rhythm] : with a regular rhythm [Normal S1, S2] : normal S1 and S2 [No Murmurs] : no murmurs heard [No Edema] : there was no peripheral edema [Normal Bowel Sounds] : normal bowel sounds [Non Tender] : non-tender [Soft] : abdomen soft [Not Distended] : not distended [No Masses] : no abdominal mass palpated [Kyphosis] :  kyphosis present [No Joint Swelling] : no joint swelling seen [No Clubbing, Cyanosis] : no clubbing  or cyanosis of the fingernails [No Rash] : no rash [No Skin Lesions] : no skin lesions [Scoliosis] : scoliosis not present [de-identified] : unable to assess [de-identified] : alert to name, confused

## 2024-04-24 NOTE — CURRENT MEDS
[Medication and Allergies Reconciled] : medication and allergies reconciled [High Risk Medications Reviewed and Reconciled (Beers Criteria)] : high risk medications reviewed and reconciled [Reviewed patient reported medication adherence from Comprehensive Assessment] : Reviewed patient reported medication adherence from comprehensive assessment [Adherent to medications] : Patient is adherent to medications as prescribed [de-identified] : managed by jose alberto

## 2024-04-24 NOTE — HISTORY OF PRESENT ILLNESS
[In-Place] : has aide services in-place [Patient is stable] : patient is stable [Family Member] : family member [FreeTextEntry1] : Dementia [FreeTextEntry2] : PMH: Dementia, glaucoma, hand arthritis, hyperlipidemia , recent memory loss, who is accompanied by her son   HPI/ROS with son Alert to name, confused otherwise, sitting on couch for visit Appetite: up and down- on pureed diet- uses [employer] Meals; regular liquids- no cough with meals Ambulates short distances with walker and assistance- no recent falls last 1- 1.5 years ago Uses bathroom but has some incontinence, no constipation Skin intact- sometimes has blotches on back from scratching   - Alzheimer's dementia w/depression -has agitation, some days good, some days bad; has had delusions & hallucinations- "now lives in her own world"; sleeps during day, up at night some nights talking; needs to be reminded to eat, sometimes needs to be fed; on escitalopram 20 po daily since 2018, memantine 10mg in AM, 5mg in PM; MRI Brain 3/2020 - 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

## 2024-04-26 LAB
ALBUMIN SERPL ELPH-MCNC: 3.9 G/DL
ALP BLD-CCNC: 62 U/L
ALT SERPL-CCNC: 15 U/L
ANION GAP SERPL CALC-SCNC: 9 MMOL/L
AST SERPL-CCNC: 20 U/L
BASOPHILS # BLD AUTO: 0.04 K/UL
BASOPHILS NFR BLD AUTO: 0.6 %
BILIRUB SERPL-MCNC: 0.4 MG/DL
BUN SERPL-MCNC: 17 MG/DL
CALCIUM SERPL-MCNC: 8.7 MG/DL
CHLORIDE SERPL-SCNC: 105 MMOL/L
CO2 SERPL-SCNC: 27 MMOL/L
CREAT SERPL-MCNC: 0.73 MG/DL
EGFR: 73 ML/MIN/1.73M2
EOSINOPHIL # BLD AUTO: 0.32 K/UL
EOSINOPHIL NFR BLD AUTO: 4.9 %
GLUCOSE SERPL-MCNC: 153 MG/DL
HCT VFR BLD CALC: 41.2 %
HGB BLD-MCNC: 13.8 G/DL
IMM GRANULOCYTES NFR BLD AUTO: 0.3 %
LYMPHOCYTES # BLD AUTO: 2 K/UL
LYMPHOCYTES NFR BLD AUTO: 30.6 %
MAN DIFF?: NORMAL
MCHC RBC-ENTMCNC: 33.4 PG
MCHC RBC-ENTMCNC: 33.5 GM/DL
MCV RBC AUTO: 99.8 FL
MONOCYTES # BLD AUTO: 0.5 K/UL
MONOCYTES NFR BLD AUTO: 7.6 %
NEUTROPHILS # BLD AUTO: 3.66 K/UL
NEUTROPHILS NFR BLD AUTO: 56 %
PLATELET # BLD AUTO: 218 K/UL
POTASSIUM SERPL-SCNC: 4.1 MMOL/L
PROT SERPL-MCNC: 6.5 G/DL
RBC # BLD: 4.13 M/UL
RBC # FLD: 13.3 %
SODIUM SERPL-SCNC: 141 MMOL/L
TSH SERPL-ACNC: 2.65 UIU/ML
WBC # FLD AUTO: 6.54 K/UL

## 2024-06-09 ENCOUNTER — NON-APPOINTMENT (OUTPATIENT)
Age: 89
End: 2024-06-09

## 2024-06-10 ENCOUNTER — TRANSCRIPTION ENCOUNTER (OUTPATIENT)
Age: 89
End: 2024-06-10

## 2024-07-30 ENCOUNTER — APPOINTMENT (OUTPATIENT)
Dept: HOME HEALTH SERVICES | Facility: HOME HEALTH | Age: 89
End: 2024-07-30
Payer: MEDICARE

## 2024-07-30 VITALS
HEART RATE: 77 BPM | DIASTOLIC BLOOD PRESSURE: 70 MMHG | TEMPERATURE: 97.1 F | OXYGEN SATURATION: 98 % | SYSTOLIC BLOOD PRESSURE: 110 MMHG | RESPIRATION RATE: 16 BRPM

## 2024-07-30 DIAGNOSIS — G30.9 ALZHEIMER'S DISEASE, UNSPECIFIED: ICD-10-CM

## 2024-07-30 DIAGNOSIS — F02.811 ALZHEIMER'S DISEASE, UNSPECIFIED: ICD-10-CM

## 2024-07-30 DIAGNOSIS — B35.1 TINEA UNGUIUM: ICD-10-CM

## 2024-07-30 DIAGNOSIS — M85.88 OTHER SPECIFIED DISORDERS OF BONE DENSITY AND STRUCTURE, OTHER SITE: ICD-10-CM

## 2024-07-30 DIAGNOSIS — F22 DELUSIONAL DISORDERS: ICD-10-CM

## 2024-07-30 PROCEDURE — 99348 HOME/RES VST EST LOW MDM 30: CPT

## 2024-08-02 PROBLEM — M85.88 MASS OF HARD PALATE: Status: ACTIVE | Noted: 2024-08-02

## 2024-08-02 PROBLEM — B35.1 FUNGAL INFECTION OF TOENAIL: Status: ACTIVE | Noted: 2024-08-02

## 2024-08-02 NOTE — REASON FOR VISIT
[Follow-Up] : a follow-up visit [Family Member] : family member [Formal Caregiver] : formal caregiver [Pre-Visit Preparation] : pre-visit preparation was done [Intercurrent Specialty/Sub-specialty Visits] : the patient has no intercurrent specialty/sub-specialty visits [FreeTextEntry1] : Dementia, glaucoma, hand arthritis, hyperlipidemia [FreeTextEntry2] : chart review

## 2024-08-02 NOTE — HEALTH RISK ASSESSMENT
[HRA Reviewed] : Health risk assessment reviewed [Some assistance needed] : walking [Full assistance needed] : managing finances [No falls in past year] : Patient reported no falls in the past year [Yes] : The patient does have visual impairment [TimeGetUpGo] : 60 [de-identified] : needs walker and assistance to ambulate

## 2024-08-02 NOTE — COUNSELING
[Normal Weight - ( BMI  <25 )] : normal weight - ( BMI  <25 ) [Continue diet as tolerated] : continue diet as tolerated based on goals of care [Use assistive device to avoid falls] : use assistive device to avoid falls [Remove clutter and unsafe carpeting to avoid falls] : remove clutter and unsafe carpeting to avoid falls [] : foot exam [Minimize unnecessary interventions] : minimize unnecessary interventions [Maintain functional ability] : maintain functional ability [Discussed disease trajectory with patient/caregiver] : discussed disease trajectory with patient/caregiver [Likely to achieve goals/desired outcomes] : likely to achieve goals/desired outcomes [Patient/Caregiver has ___ understanding of disease process] : patient/caregiver has [unfilled] understanding of disease process [FreeTextEntry4] : Son wishes to avoid hospitals and keep patient comfortable [de-identified] : HCP daughter called to review [facility]- message left

## 2024-08-02 NOTE — CHRONIC CARE ASSESSMENT
[Limited decision making ability] : limited decision making ability [Other: ____] : [unfilled] [Can not Exercise (Disability)] : Exercise: The patient can not exercise due to disability [de-identified] : pureed diet

## 2024-08-02 NOTE — CURRENT MEDS
[Medication and Allergies Reconciled] : medication and allergies reconciled [High Risk Medications Reviewed and Reconciled (Beers Criteria)] : high risk medications reviewed and reconciled [Reviewed patient reported medication adherence from Comprehensive Assessment] : Reviewed patient reported medication adherence from comprehensive assessment [Adherent to medications] : Patient is adherent to medications as prescribed [de-identified] : managed by jose alberto

## 2024-08-02 NOTE — REVIEW OF SYSTEMS
[Memory Loss] : memory loss [Unsteady Walking] : ataxia [Negative] : Heme/Lymph [Confusion] : no confusion [FreeTextEntry4] : as noted in HPI [de-identified] : as noted in HPI [FreeTextEntry1] : ERON w/ son

## 2024-08-02 NOTE — COUNSELING
[Normal Weight - ( BMI  <25 )] : normal weight - ( BMI  <25 ) [Continue diet as tolerated] : continue diet as tolerated based on goals of care [Use assistive device to avoid falls] : use assistive device to avoid falls [Remove clutter and unsafe carpeting to avoid falls] : remove clutter and unsafe carpeting to avoid falls [] : foot exam [Minimize unnecessary interventions] : minimize unnecessary interventions [Maintain functional ability] : maintain functional ability [Discussed disease trajectory with patient/caregiver] : discussed disease trajectory with patient/caregiver [Likely to achieve goals/desired outcomes] : likely to achieve goals/desired outcomes [Patient/Caregiver has ___ understanding of disease process] : patient/caregiver has [unfilled] understanding of disease process [FreeTextEntry4] : Son wishes to avoid hospitals and keep patient comfortable [de-identified] : HCP daughter called to review [facility]- message left Strong peripheral pulses/Capillary refill less/equal to 2 seconds

## 2024-08-02 NOTE — CHRONIC CARE ASSESSMENT
[Limited decision making ability] : limited decision making ability [Other: ____] : [unfilled] [Can not Exercise (Disability)] : Exercise: The patient can not exercise due to disability [de-identified] : pureed diet

## 2024-08-02 NOTE — HEALTH RISK ASSESSMENT
[HRA Reviewed] : Health risk assessment reviewed [Some assistance needed] : walking [Full assistance needed] : managing finances [No falls in past year] : Patient reported no falls in the past year [Yes] : The patient does have visual impairment [TimeGetUpGo] : 60 [de-identified] : needs walker and assistance to ambulate

## 2024-08-02 NOTE — PHYSICAL EXAM
[No Acute Distress] : no acute distress [Normal Sclera/Conjunctiva] : normal sclera/conjunctiva [Normal Outer Ear/Nose] : the ears and nose were normal in appearance [No Respiratory Distress] : no respiratory distress [Clear to Auscultation] : lungs were clear to auscultation bilaterally [No Accessory Muscle Use] : no accessory muscle use [Normal Rate] : heart rate was normal  [Regular Rhythm] : with a regular rhythm [Normal S1, S2] : normal S1 and S2 [No Murmurs] : no murmurs heard [No Edema] : there was no peripheral edema [Normal Bowel Sounds] : normal bowel sounds [Non Tender] : non-tender [Soft] : abdomen soft [Not Distended] : not distended [No Masses] : no abdominal mass palpated [Kyphosis] :  kyphosis present [No Joint Swelling] : no joint swelling seen [No Clubbing, Cyanosis] : no clubbing  or cyanosis of the fingernails [No Rash] : no rash [No Skin Lesions] : no skin lesions [PERRL] : pupils equal, round and reactive to light [Scoliosis] : scoliosis not present [de-identified] : mass of hard palate [de-identified] : fungal nail infection [de-identified] : unable to assess [de-identified] : alert to name, confused

## 2024-08-02 NOTE — HISTORY OF PRESENT ILLNESS
[In-Place] : has aide services in-place [Patient is stable] : patient is stable [Family Member] : family member [FreeTextEntry1] : Dementia [FreeTextEntry2] : PMH: Dementia, glaucoma, hand arthritis, hyperlipidemia , recent memory loss, who is accompanied by her son   HPI/ROS with son  Alert to name only sitting in kitchen drinking chocolate milk with no coughing Son reports "something on top of mouth - has palate mass Appetite: remains fair on pureed diet- uses [employer] Meals; regular liquids-  Ambulating short distances with walker and assistance of son and aide- no recent falls last 1- 1.5 years ago Uses bathroom but has some incontinence, no constipation Skin intact- sometimes has blotches on back from scratching   - Alzheimer's dementia w/depression -has agitation, some days good, some days bad; has had delusions & hallucinations- "now lives in her own world"; sleeps during day, up at night some nights talking; needs to be reminded to eat, sometimes needs to be fed; on escitalopram 20 po daily since 2018, memantine 10mg in AM, 5mg in PM; MRI Brain 3/2020 - 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

## 2024-08-02 NOTE — PHYSICAL EXAM
[No Acute Distress] : no acute distress [Normal Sclera/Conjunctiva] : normal sclera/conjunctiva [Normal Outer Ear/Nose] : the ears and nose were normal in appearance [No Respiratory Distress] : no respiratory distress [Clear to Auscultation] : lungs were clear to auscultation bilaterally [No Accessory Muscle Use] : no accessory muscle use [Normal Rate] : heart rate was normal  [Regular Rhythm] : with a regular rhythm [Normal S1, S2] : normal S1 and S2 [No Murmurs] : no murmurs heard [No Edema] : there was no peripheral edema [Normal Bowel Sounds] : normal bowel sounds [Non Tender] : non-tender [Soft] : abdomen soft [Not Distended] : not distended [No Masses] : no abdominal mass palpated [Kyphosis] :  kyphosis present [No Joint Swelling] : no joint swelling seen [No Clubbing, Cyanosis] : no clubbing  or cyanosis of the fingernails [No Rash] : no rash [No Skin Lesions] : no skin lesions [PERRL] : pupils equal, round and reactive to light [Scoliosis] : scoliosis not present [de-identified] : mass of hard palate [de-identified] : fungal nail infection [de-identified] : unable to assess [de-identified] : alert to name, confused

## 2024-08-02 NOTE — REVIEW OF SYSTEMS
[Memory Loss] : memory loss [Unsteady Walking] : ataxia [Negative] : Heme/Lymph [Confusion] : no confusion [FreeTextEntry4] : as noted in HPI [de-identified] : as noted in HPI [FreeTextEntry1] : ERON w/ son

## 2024-08-02 NOTE — CURRENT MEDS
[Medication and Allergies Reconciled] : medication and allergies reconciled [High Risk Medications Reviewed and Reconciled (Beers Criteria)] : high risk medications reviewed and reconciled [Reviewed patient reported medication adherence from Comprehensive Assessment] : Reviewed patient reported medication adherence from comprehensive assessment [Adherent to medications] : Patient is adherent to medications as prescribed [de-identified] : managed by jose alberto

## 2024-08-26 ENCOUNTER — NON-APPOINTMENT (OUTPATIENT)
Age: 89
End: 2024-08-26

## 2024-08-26 ENCOUNTER — APPOINTMENT (OUTPATIENT)
Dept: HOME HEALTH SERVICES | Facility: HOME HEALTH | Age: 89
End: 2024-08-26

## 2024-10-21 ENCOUNTER — APPOINTMENT (OUTPATIENT)
Dept: HOME HEALTH SERVICES | Facility: HOME HEALTH | Age: 89
End: 2024-10-21
Payer: MEDICARE

## 2024-10-21 VITALS
TEMPERATURE: 97.1 F | SYSTOLIC BLOOD PRESSURE: 120 MMHG | DIASTOLIC BLOOD PRESSURE: 60 MMHG | OXYGEN SATURATION: 98 % | RESPIRATION RATE: 16 BRPM | HEART RATE: 68 BPM

## 2024-10-21 DIAGNOSIS — R26.9 UNSPECIFIED ABNORMALITIES OF GAIT AND MOBILITY: ICD-10-CM

## 2024-10-21 DIAGNOSIS — G30.9 ALZHEIMER'S DISEASE, UNSPECIFIED: ICD-10-CM

## 2024-10-21 DIAGNOSIS — F02.811 ALZHEIMER'S DISEASE, UNSPECIFIED: ICD-10-CM

## 2024-10-21 DIAGNOSIS — Z23 ENCOUNTER FOR IMMUNIZATION: ICD-10-CM

## 2024-10-21 DIAGNOSIS — F22 DELUSIONAL DISORDERS: ICD-10-CM

## 2024-10-21 PROCEDURE — 99349 HOME/RES VST EST MOD MDM 40: CPT | Mod: 25

## 2024-10-21 PROCEDURE — G0008: CPT

## 2024-10-21 PROCEDURE — 90662 IIV NO PRSV INCREASED AG IM: CPT

## 2024-10-22 PROBLEM — Z23 ENCOUNTER FOR IMMUNIZATION: Status: ACTIVE | Noted: 2024-10-21 | Resolved: 2024-11-04

## 2024-11-04 ENCOUNTER — NON-APPOINTMENT (OUTPATIENT)
Age: 89
End: 2024-11-04

## 2025-01-06 ENCOUNTER — RX RENEWAL (OUTPATIENT)
Age: 89
End: 2025-01-06

## 2025-01-06 ENCOUNTER — APPOINTMENT (OUTPATIENT)
Dept: HOME HEALTH SERVICES | Facility: HOME HEALTH | Age: 89
End: 2025-01-06

## 2025-01-06 VITALS
TEMPERATURE: 96.9 F | DIASTOLIC BLOOD PRESSURE: 80 MMHG | OXYGEN SATURATION: 98 % | SYSTOLIC BLOOD PRESSURE: 114 MMHG | HEART RATE: 76 BPM | RESPIRATION RATE: 18 BRPM

## 2025-01-06 PROCEDURE — 99348 HOME/RES VST EST LOW MDM 30: CPT

## 2025-01-10 DIAGNOSIS — Z23 ENCOUNTER FOR IMMUNIZATION: ICD-10-CM

## 2025-01-23 ENCOUNTER — NON-APPOINTMENT (OUTPATIENT)
Age: 89
End: 2025-01-23

## 2025-01-28 ENCOUNTER — LABORATORY RESULT (OUTPATIENT)
Age: 89
End: 2025-01-28

## 2025-01-29 ENCOUNTER — LABORATORY RESULT (OUTPATIENT)
Age: 89
End: 2025-01-29

## 2025-03-07 ENCOUNTER — MED ADMIN CHARGE (OUTPATIENT)
Age: 89
End: 2025-03-07

## 2025-03-07 ENCOUNTER — APPOINTMENT (OUTPATIENT)
Dept: HOME HEALTH SERVICES | Facility: HOME HEALTH | Age: 89
End: 2025-03-07

## 2025-03-07 VITALS
TEMPERATURE: 97 F | HEART RATE: 66 BPM | OXYGEN SATURATION: 96 % | SYSTOLIC BLOOD PRESSURE: 118 MMHG | DIASTOLIC BLOOD PRESSURE: 60 MMHG | RESPIRATION RATE: 17 BRPM

## 2025-03-07 PROCEDURE — 90480 ADMN SARSCOV2 VAC 1/ONLY CMP: CPT

## 2025-03-07 PROCEDURE — 91320 SARSCV2 VAC 30MCG TRS-SUC IM: CPT

## 2025-06-03 ENCOUNTER — APPOINTMENT (OUTPATIENT)
Dept: HOME HEALTH SERVICES | Facility: HOME HEALTH | Age: 89
End: 2025-06-03
Payer: MEDICARE

## 2025-06-03 VITALS
HEART RATE: 63 BPM | RESPIRATION RATE: 18 BRPM | TEMPERATURE: 97.3 F | DIASTOLIC BLOOD PRESSURE: 62 MMHG | SYSTOLIC BLOOD PRESSURE: 110 MMHG

## 2025-06-03 DIAGNOSIS — G30.9 ALZHEIMER'S DISEASE, UNSPECIFIED: ICD-10-CM

## 2025-06-03 DIAGNOSIS — F32.A DEPRESSION, UNSPECIFIED: ICD-10-CM

## 2025-06-03 DIAGNOSIS — F02.811 ALZHEIMER'S DISEASE, UNSPECIFIED: ICD-10-CM

## 2025-06-03 PROCEDURE — 99348 HOME/RES VST EST LOW MDM 30: CPT
